# Patient Record
Sex: MALE | Race: WHITE | URBAN - METROPOLITAN AREA
[De-identification: names, ages, dates, MRNs, and addresses within clinical notes are randomized per-mention and may not be internally consistent; named-entity substitution may affect disease eponyms.]

---

## 2017-08-16 ENCOUNTER — INPATIENT (INPATIENT)
Facility: HOSPITAL | Age: 52
LOS: 0 days | Discharge: HOME | End: 2017-08-17
Attending: INTERNAL MEDICINE | Admitting: INTERNAL MEDICINE
Payer: COMMERCIAL

## 2017-08-16 VITALS — WEIGHT: 210.1 LBS | HEIGHT: 74 IN

## 2017-08-16 DIAGNOSIS — I48.91 UNSPECIFIED ATRIAL FIBRILLATION: ICD-10-CM

## 2017-08-16 DIAGNOSIS — Z98.890 OTHER SPECIFIED POSTPROCEDURAL STATES: Chronic | ICD-10-CM

## 2017-08-16 LAB
ALBUMIN SERPL ELPH-MCNC: 4.3 G/DL — SIGNIFICANT CHANGE UP (ref 3.3–5)
ALP SERPL-CCNC: 54 U/L — SIGNIFICANT CHANGE UP (ref 40–120)
ALT FLD-CCNC: 26 U/L — SIGNIFICANT CHANGE UP (ref 12–78)
ANION GAP SERPL CALC-SCNC: 5 MMOL/L — SIGNIFICANT CHANGE UP (ref 5–17)
ANION GAP SERPL CALC-SCNC: 9 MMOL/L — SIGNIFICANT CHANGE UP (ref 5–17)
APTT BLD: 32.9 SEC — SIGNIFICANT CHANGE UP (ref 27.5–37.4)
AST SERPL-CCNC: 15 U/L — SIGNIFICANT CHANGE UP (ref 15–37)
BASOPHILS # BLD AUTO: 0.1 K/UL — SIGNIFICANT CHANGE UP (ref 0–0.2)
BASOPHILS NFR BLD AUTO: 0.9 % — SIGNIFICANT CHANGE UP (ref 0–2)
BILIRUB SERPL-MCNC: 0.8 MG/DL — SIGNIFICANT CHANGE UP (ref 0.2–1.2)
BUN SERPL-MCNC: 13 MG/DL — SIGNIFICANT CHANGE UP (ref 7–23)
BUN SERPL-MCNC: 16 MG/DL — SIGNIFICANT CHANGE UP (ref 7–23)
CALCIUM SERPL-MCNC: 9 MG/DL — SIGNIFICANT CHANGE UP (ref 8.5–10.1)
CALCIUM SERPL-MCNC: 9.5 MG/DL — SIGNIFICANT CHANGE UP (ref 8.5–10.1)
CHLORIDE SERPL-SCNC: 105 MMOL/L — SIGNIFICANT CHANGE UP (ref 96–108)
CHLORIDE SERPL-SCNC: 108 MMOL/L — SIGNIFICANT CHANGE UP (ref 96–108)
CHOLEST SERPL-MCNC: 201 MG/DL — HIGH (ref 10–199)
CK SERPL-CCNC: 37 U/L — SIGNIFICANT CHANGE UP (ref 26–308)
CO2 SERPL-SCNC: 26 MMOL/L — SIGNIFICANT CHANGE UP (ref 22–31)
CO2 SERPL-SCNC: 31 MMOL/L — SIGNIFICANT CHANGE UP (ref 22–31)
CREAT SERPL-MCNC: 1.13 MG/DL — SIGNIFICANT CHANGE UP (ref 0.5–1.3)
CREAT SERPL-MCNC: 1.34 MG/DL — HIGH (ref 0.5–1.3)
EOSINOPHIL # BLD AUTO: 0.2 K/UL — SIGNIFICANT CHANGE UP (ref 0–0.5)
EOSINOPHIL NFR BLD AUTO: 1.9 % — SIGNIFICANT CHANGE UP (ref 0–6)
GLUCOSE SERPL-MCNC: 119 MG/DL — HIGH (ref 70–99)
GLUCOSE SERPL-MCNC: 92 MG/DL — SIGNIFICANT CHANGE UP (ref 70–99)
HCT VFR BLD CALC: 47.3 % — SIGNIFICANT CHANGE UP (ref 39–50)
HDLC SERPL-MCNC: 48 MG/DL — SIGNIFICANT CHANGE UP (ref 40–125)
HGB BLD-MCNC: 16.8 G/DL — SIGNIFICANT CHANGE UP (ref 13–17)
INR BLD: 0.97 RATIO — SIGNIFICANT CHANGE UP (ref 0.88–1.16)
LIPID PNL WITH DIRECT LDL SERPL: 127 MG/DL — SIGNIFICANT CHANGE UP
LYMPHOCYTES # BLD AUTO: 2.5 K/UL — SIGNIFICANT CHANGE UP (ref 1–3.3)
LYMPHOCYTES # BLD AUTO: 30.6 % — SIGNIFICANT CHANGE UP (ref 13–44)
MCHC RBC-ENTMCNC: 34.6 PG — HIGH (ref 27–34)
MCHC RBC-ENTMCNC: 35.6 GM/DL — SIGNIFICANT CHANGE UP (ref 32–36)
MCV RBC AUTO: 97.1 FL — SIGNIFICANT CHANGE UP (ref 80–100)
MONOCYTES # BLD AUTO: 0.6 K/UL — SIGNIFICANT CHANGE UP (ref 0–0.9)
MONOCYTES NFR BLD AUTO: 7.9 % — SIGNIFICANT CHANGE UP (ref 2–14)
NEUTROPHILS # BLD AUTO: 4.8 K/UL — SIGNIFICANT CHANGE UP (ref 1.8–7.4)
NEUTROPHILS NFR BLD AUTO: 58.8 % — SIGNIFICANT CHANGE UP (ref 43–77)
NT-PROBNP SERPL-SCNC: 18 PG/ML — SIGNIFICANT CHANGE UP (ref 0–125)
PLATELET # BLD AUTO: 199 K/UL — SIGNIFICANT CHANGE UP (ref 150–400)
POTASSIUM SERPL-MCNC: 3.6 MMOL/L — SIGNIFICANT CHANGE UP (ref 3.5–5.3)
POTASSIUM SERPL-MCNC: 4.1 MMOL/L — SIGNIFICANT CHANGE UP (ref 3.5–5.3)
POTASSIUM SERPL-SCNC: 3.6 MMOL/L — SIGNIFICANT CHANGE UP (ref 3.5–5.3)
POTASSIUM SERPL-SCNC: 4.1 MMOL/L — SIGNIFICANT CHANGE UP (ref 3.5–5.3)
PROT SERPL-MCNC: 7.5 GM/DL — SIGNIFICANT CHANGE UP (ref 6–8.3)
PROTHROM AB SERPL-ACNC: 10.5 SEC — SIGNIFICANT CHANGE UP (ref 9.8–12.7)
RBC # BLD: 4.88 M/UL — SIGNIFICANT CHANGE UP (ref 4.2–5.8)
RBC # FLD: 12.6 % — SIGNIFICANT CHANGE UP (ref 10.3–14.5)
SODIUM SERPL-SCNC: 140 MMOL/L — SIGNIFICANT CHANGE UP (ref 135–145)
SODIUM SERPL-SCNC: 144 MMOL/L — SIGNIFICANT CHANGE UP (ref 135–145)
T3 SERPL-MCNC: 97 NG/DL — SIGNIFICANT CHANGE UP (ref 80–200)
T4 AB SER-ACNC: 7.8 UG/DL — SIGNIFICANT CHANGE UP (ref 4.6–12)
TOTAL CHOLESTEROL/HDL RATIO MEASUREMENT: 4.2 RATIO — SIGNIFICANT CHANGE UP (ref 3.4–9.6)
TRIGL SERPL-MCNC: 131 MG/DL — SIGNIFICANT CHANGE UP (ref 10–149)
TROPONIN I SERPL-MCNC: <0.015 NG/ML — SIGNIFICANT CHANGE UP (ref 0.01–0.04)
TSH SERPL-MCNC: 1.04 UU/ML — SIGNIFICANT CHANGE UP (ref 0.36–3.74)
WBC # BLD: 8.2 K/UL — SIGNIFICANT CHANGE UP (ref 3.8–10.5)
WBC # FLD AUTO: 8.2 K/UL — SIGNIFICANT CHANGE UP (ref 3.8–10.5)

## 2017-08-16 PROCEDURE — 71010: CPT | Mod: 26

## 2017-08-16 PROCEDURE — 99285 EMERGENCY DEPT VISIT HI MDM: CPT

## 2017-08-16 PROCEDURE — 93010 ELECTROCARDIOGRAM REPORT: CPT

## 2017-08-16 PROCEDURE — 99223 1ST HOSP IP/OBS HIGH 75: CPT

## 2017-08-16 RX ORDER — SODIUM CHLORIDE 9 MG/ML
1000 INJECTION INTRAMUSCULAR; INTRAVENOUS; SUBCUTANEOUS
Qty: 0 | Refills: 0 | Status: DISCONTINUED | OUTPATIENT
Start: 2017-08-16 | End: 2017-08-17

## 2017-08-16 RX ORDER — ASPIRIN/CALCIUM CARB/MAGNESIUM 324 MG
81 TABLET ORAL DAILY
Qty: 0 | Refills: 0 | Status: DISCONTINUED | OUTPATIENT
Start: 2017-08-16 | End: 2017-08-17

## 2017-08-16 RX ORDER — METOPROLOL TARTRATE 50 MG
5 TABLET ORAL ONCE
Qty: 0 | Refills: 0 | Status: DISCONTINUED | OUTPATIENT
Start: 2017-08-16 | End: 2017-08-16

## 2017-08-16 RX ORDER — SODIUM CHLORIDE 9 MG/ML
3 INJECTION INTRAMUSCULAR; INTRAVENOUS; SUBCUTANEOUS ONCE
Qty: 0 | Refills: 0 | Status: COMPLETED | OUTPATIENT
Start: 2017-08-16 | End: 2017-08-16

## 2017-08-16 RX ORDER — SODIUM CHLORIDE 9 MG/ML
1000 INJECTION INTRAMUSCULAR; INTRAVENOUS; SUBCUTANEOUS ONCE
Qty: 0 | Refills: 0 | Status: COMPLETED | OUTPATIENT
Start: 2017-08-16 | End: 2017-08-16

## 2017-08-16 RX ADMIN — SODIUM CHLORIDE 3 MILLILITER(S): 9 INJECTION INTRAMUSCULAR; INTRAVENOUS; SUBCUTANEOUS at 08:18

## 2017-08-16 RX ADMIN — SODIUM CHLORIDE 125 MILLILITER(S): 9 INJECTION INTRAMUSCULAR; INTRAVENOUS; SUBCUTANEOUS at 08:45

## 2017-08-16 RX ADMIN — Medication 81 MILLIGRAM(S): at 15:19

## 2017-08-16 RX ADMIN — SODIUM CHLORIDE 1000 MILLILITER(S): 9 INJECTION INTRAMUSCULAR; INTRAVENOUS; SUBCUTANEOUS at 08:10

## 2017-08-16 NOTE — ED PROVIDER NOTE - OBJECTIVE STATEMENT
52 y/o 51 y/o male in ED c/o worsening palpitations x 2 days.  pt states h/o AF that has been controlled.  pt denies any fever, HA, n/v/d/abd pain.  states mild chest discomfort with intermittent sob.  no sick contacts or recent travel.  tolerating PO.

## 2017-08-16 NOTE — H&P ADULT - HISTORY OF PRESENT ILLNESS
Patient is a 51 y/o/m in ED c/o worsening palpitations x 2 days.  pt states h/o AF that has been controlled.  pt denies any fever, HA, n/v/d/abd pain.  states mild chest discomfort with intermittent sob.  no sick contacts or recent travel.  tolerating PO. Patient is a 51 y/o/m admitted in the ED 8/16 w/ cc of worsening palpitations x 2 days.  Pt states h/o AF that has been controlled - has not been on any medications. Last time with atrial fibrillation - > converted to NSR in 20 hours.  Denies any HA, CP. Intermittent SOB. No jaw pain, shoulder pain or back pain.  pt denies any fever, HA, n/v/d/abd pain.  no sick contacts or recent travel.

## 2017-08-16 NOTE — CONSULT NOTE ADULT - SUBJECTIVE AND OBJECTIVE BOX
PCP:    REQUESTING PHYSICIAN:    REASON FOR CONSULT:    CHIEF COMPLAINT:    HPI:  Patient is a 51 y/o/m admitted in the ED 8/16 w/ cc of worsening palpitations x 2 days.  Pt states h/o AF that has been controlled - has not been on any medications. Last time with atrial fibrillation - > converted to NSR in 20 hours.  Denies any HA, CP. Intermittent SOB. No jaw pain, shoulder pain or back pain.  pt denies any fever, HA, n/v/d/abd pain.  no sick contacts or recent travel. (16 Aug 2017 11:36)  Pt reports a history of negative ETT in the past by his cardiologist in [place]. He has never been anticoagulated for atrial fibrillation. He denies exertional symptoms. His blood pressure is appx 100 systolic. He has had an 80 lb weight loss appx 4 years ago and has been maintained on "diet herbs" since. He is unaware of the contents of the drops.       PAST MEDICAL & SURGICAL HISTORY:  Chiari I malformation  Atrial fibrillation, unspecified type  H/O Spinal surgery      Allergies    cinnamon (Unknown)  Originally Entered as [Unknown] reaction to [safflower oil] (Unknown)  penicillins (Hives)  shellfish (Unknown)    Intolerances        SOCIAL HISTORY:    FAMILY HISTORY:  Family history of diabetes mellitus in father (Father)      MEDICATIONS:  MEDICATIONS  (STANDING):  sodium chloride 0.9%. 1000 milliLiter(s) (125 mL/Hr) IV Continuous <Continuous>  diltiazem    Tablet 30 milliGRAM(s) Oral three times a day  aspirin  chewable 81 milliGRAM(s) Oral daily    MEDICATIONS  (PRN):      REVIEW OF SYSTEMS:    CONSTITUTIONAL: No weakness, fevers or chills  EYES/ENT: No visual changes;  No vertigo or throat pain   NECK: No pain or stiffness  RESPIRATORY: No cough, wheezing, hemoptysis; No shortness of breath  CARDIOVASCULAR: No chest pain or palpitations  GASTROINTESTINAL: No abdominal or epigastric pain. No nausea, vomiting, or hematemesis; No diarrhea or constipation. No melena or hematochezia.  GENITOURINARY: No dysuria, frequency or hematuria  NEUROLOGICAL: No numbness or weakness  SKIN: No itching, burning, rashes, or lesions   All other review of systems is negative unless indicated above    Vital Signs Last 24 Hrs  T(C): 36.8 (16 Aug 2017 17:09), Max: 36.8 (16 Aug 2017 17:09)  T(F): 98.2 (16 Aug 2017 17:09), Max: 98.2 (16 Aug 2017 17:09)  HR: 106 (16 Aug 2017 17:09) (77 - 128)  BP: 101/63 (16 Aug 2017 17:09) (101/63 - 127/89)  BP(mean): --  RR: 13 (16 Aug 2017 12:45) (13 - 19)  SpO2: 98% (16 Aug 2017 17:09) (98% - 100%)    I&O's Summary    16 Aug 2017 07:01  -  16 Aug 2017 17:43  --------------------------------------------------------  IN: 0 mL / OUT: 600 mL / NET: -600 mL        PHYSICAL EXAM:    Constitutional: NAD, awake and alert, well-developed  HEENT: PERR, EOMI,  No oral cyananosis.  Neck:  supple,  No JVD  Respiratory: Breath sounds are clear bilaterally, No wheezing, rales or rhonchi  Cardiovascular: S1 and S2,irregular rate and rhythm, no Murmurs, gallops or rubs  Gastrointestinal: Bowel Sounds present, soft, nontender.   Extremities: No peripheral edema. No clubbing or cyanosis.  Vascular: 2+ peripheral pulses  Neurological: A/O x 3, no focal deficits  Musculoskeletal: no calf tenderness.  Skin: No rashes.      LABS: All Labs Reviewed:                        16.8   8.2   )-----------( 199      ( 16 Aug 2017 07:51 )             47.3     16 Aug 2017 16:47    144    |  108    |  13     ----------------------------<  119    4.1     |  31     |  1.34   16 Aug 2017 07:51    140    |  105    |  16     ----------------------------<  92     3.6     |  26     |  1.13     Ca    9.0        16 Aug 2017 16:47  Ca    9.5        16 Aug 2017 07:51    TPro  7.5    /  Alb  4.3    /  TBili  0.8    /  DBili  x      /  AST  15     /  ALT  26     /  AlkPhos  54     16 Aug 2017 07:51    PT/INR - ( 16 Aug 2017 07:51 )   PT: 10.5 sec;   INR: 0.97 ratio         PTT - ( 16 Aug 2017 07:51 )  PTT:32.9 sec  CARDIAC MARKERS ( 16 Aug 2017 07:51 )  <0.015 ng/mL / x     / 37 U/L / x     / x          Blood Culture:   08-16 @ 07:51  Pro Bnp 18    08-16 @ 16:47  TSH: 1.040      RADIOLOGY/EKG:  < from: 12 Lead ECG (08.16.17 @ 07:48) >  Diagnosis Line Atrial fibrillation with rapid ventricular response  Nonspecific ST abnormality  Abnormal ECG  Confirmed by FELIX CAREY, LAURITA ([id number]) on 8/16/2017 8:29:07 AM    < end of copied text >

## 2017-08-16 NOTE — H&P ADULT - NSHPLABSRESULTS_GEN_ALL_CORE
CBC Full  -  ( 16 Aug 2017 07:51 )  WBC Count : 8.2 K/uL  Hemoglobin : 16.8 g/dL  Hematocrit : 47.3 %  Platelet Count - Automated : 199 K/uL    PT/INR - ( 16 Aug 2017 07:51 )   PT: 10.5 sec;   INR: 0.97 ratio      PTT - ( 16 Aug 2017 07:51 )  PTT:32.9 sec    140  |  105  |  16  ----------------------------<  92  3.6   |  26  |  1.13    Ca    9.5      16 Aug 2017 07:51    TPro  7.5  /  Alb  4.3  /  TBili  0.8  /  DBili  x   /  AST  15  /  ALT  26  /  AlkPhos  54  08-16

## 2017-08-16 NOTE — H&P ADULT - FAMILY HISTORY
No pertinent family history in first degree relatives Father  Still living? Unknown  Family history of diabetes mellitus in father, Age at diagnosis: Age Unknown

## 2017-08-16 NOTE — H&P ADULT - PMH
Atrial fibrillation, unspecified type Atrial fibrillation, unspecified type    Chiari I malformation

## 2017-08-16 NOTE — H&P ADULT - PROBLEM SELECTOR PLAN 1
- ASA  - echo cardiogram  - TSH  - Started on po cardizem  - Cardio eval - CHADS2 score 1 (HTN)  - ASA  - echo cardiogram  - TSH  - Started on po cardizem  - Cardio eval

## 2017-08-16 NOTE — H&P ADULT - NSHPREVIEWOFSYSTEMS_GEN_ALL_CORE
REVIEW OF SYSTEMS:  General: NAD, hemodynamically stable, (-)  fever, (-) chills, (-) weakness  HEENT:  Eyes:  No visual loss, blurred vision, double vision or yellow sclerae. Ears, Nose, Throat:  No hearing loss, sneezing, congestion, runny nose or sore throat.  SKIN:  No rash or itching.  CARDIOVASCULAR:  No chest pain, chest pressure or chest discomfort. No palpitations or edema.  RESPIRATORY:  No shortness of breath, cough or sputum.  GASTROINTESTINAL:  No anorexia, nausea, vomiting or diarrhea. No abdominal pain or blood.  NEUROLOGICAL:  No headache, dizziness, syncope, paralysis, ataxia, numbness or tingling in the extremities. No change in bowel or bladder control.  MUSCULOSKELETAL:  No muscle, back pain, joint pain or stiffness.  HEMATOLOGIC:  No anemia, bleeding or bruising.  LYMPHATICS:  No enlarged nodes. No history of splenectomy.  ENDOCRINOLOGIC:  No reports of sweating, cold or heat intolerance. No polyuria or polydipsia.  ALLERGIES:  No history of asthma, hives, eczema or rhinitis. REVIEW OF SYSTEMS:  General: NAD, hemodynamically stable, (-)  fever, (-) chills, (-) weakness  HEENT:  Eyes:  No visual loss, blurred vision, double vision or yellow sclerae. Ears, Nose, Throat:  No hearing loss, sneezing, congestion, runny nose or sore throat.  SKIN:  No rash or itching.  CARDIOVASCULAR:  No chest pain, chest pressure or chest discomfort. No palpitations or edema.  RESPIRATORY:  intermittent SOB (resolved)  GASTROINTESTINAL:  No anorexia, nausea, vomiting or diarrhea. No abdominal pain or blood.  NEUROLOGICAL:  No headache, dizziness, syncope, paralysis, ataxia, numbness or tingling in the extremities. No change in bowel or bladder control.  MUSCULOSKELETAL:  No muscle, back pain, joint pain or stiffness.  HEMATOLOGIC:  No anemia, bleeding or bruising.  LYMPHATICS:  No enlarged nodes. No history of splenectomy.  ENDOCRINOLOGIC:  No reports of sweating, cold or heat intolerance. No polyuria or polydipsia.  ALLERGIES:  No history of asthma, hives, eczema or rhinitis.

## 2017-08-16 NOTE — ED ADULT NURSE NOTE - OBJECTIVE STATEMENT
Pt complains of "irregular pulse" since 7:10 this morning.  Pt denies chest pain.  EKG done on arrival.  Cardiac and VS monitoring initiated.  20g PIV placed in L AC.

## 2017-08-16 NOTE — H&P ADULT - NSHPPHYSICALEXAM_GEN_ALL_CORE
Physical Exam:   GENERAL APPEARANCE:  NAD, hemodynamically stable  T(C): 36.6 (08-16-17 @ 07:53), Max: 36.6 (08-16-17 @ 07:53)  HR: 77 (08-16-17 @ 08:20) (77 - 128)  BP: 115/77 (08-16-17 @ 08:20) (115/71 - 127/89)  RR: 14 (08-16-17 @ 08:13) (14 - 19)  SpO2: 100% (08-16-17 @ 08:13) (100% - 100%)  HEENT:  Head is normocephalic    Skin:  Warm and dry without any rash   NECK:  Supple without lymphadenopathy.   HEART:  Regular rate and rhythm. normal S1 and S2, No M/R/G  LUNGS:  Good ins/exp effort, no W/R/R/C  ABDOMEN:  Soft, nontender, nondistended with good bowel sounds heard  EXTREMITIES:  Without cyanosis, clubbing or edema.   NEUROLOGICAL:  Gross nonfocal

## 2017-08-16 NOTE — CONSULT NOTE ADULT - PROBLEM SELECTOR RECOMMENDATION 9
Continue to monitor. EP to evaluate. Further chronotropic meds are limited by pt's marginal blood pressure even when is rate is acceptable. Will obtain contents of herbal supplement. Chads 1

## 2017-08-16 NOTE — ED PROVIDER NOTE - PROGRESS NOTE DETAILS
pt with rapid AF controlled currently.   will admit for further treatment and w/u.  case d/w Surguladze and will admit

## 2017-08-17 ENCOUNTER — TRANSCRIPTION ENCOUNTER (OUTPATIENT)
Age: 52
End: 2017-08-17

## 2017-08-17 VITALS
OXYGEN SATURATION: 97 % | SYSTOLIC BLOOD PRESSURE: 115 MMHG | RESPIRATION RATE: 15 BRPM | TEMPERATURE: 98 F | HEART RATE: 83 BPM | DIASTOLIC BLOOD PRESSURE: 76 MMHG

## 2017-08-17 PROCEDURE — 93306 TTE W/DOPPLER COMPLETE: CPT | Mod: 26

## 2017-08-17 PROCEDURE — 99233 SBSQ HOSP IP/OBS HIGH 50: CPT

## 2017-08-17 PROCEDURE — 93010 ELECTROCARDIOGRAM REPORT: CPT

## 2017-08-17 RX ORDER — DILTIAZEM HCL 120 MG
1 CAPSULE, EXT RELEASE 24 HR ORAL
Qty: 0 | Refills: 0 | DISCHARGE
Start: 2017-08-17

## 2017-08-17 RX ORDER — ASPIRIN/CALCIUM CARB/MAGNESIUM 324 MG
1 TABLET ORAL
Qty: 0 | Refills: 0 | DISCHARGE
Start: 2017-08-17

## 2017-08-17 RX ORDER — DILTIAZEM HCL 120 MG
1 CAPSULE, EXT RELEASE 24 HR ORAL
Qty: 42 | Refills: 1 | OUTPATIENT
Start: 2017-08-17 | End: 2017-09-13

## 2017-08-17 RX ADMIN — Medication 81 MILLIGRAM(S): at 13:04

## 2017-08-17 NOTE — DISCHARGE NOTE ADULT - CARE PLAN
Principal Discharge DX:	Paroxysmal a-fib  Goal:	f/u with outpatient cardiologist w/ in one week.  Instructions for follow-up, activity and diet:	c/w cardiazem for rate control; f/u w/ outpatient cardiology

## 2017-08-17 NOTE — DISCHARGE NOTE ADULT - PATIENT PORTAL LINK FT
“You can access the FollowHealth Patient Portal, offered by [facility], by registering with the following website: [email or web address]”

## 2017-08-17 NOTE — DISCHARGE NOTE ADULT - HOSPITAL COURSE
Vital Signs Last 24 Hrs  T(C): 36.9 (17 Aug 2017 10:52), Max: 36.9 (17 Aug 2017 10:52)  T(F): 98.5 (17 Aug 2017 10:52), Max: 98.5 (17 Aug 2017 10:52)  HR: 83 (17 Aug 2017 10:52) (83 - 106)  BP: 115/76 (17 Aug 2017 10:52) (101/63 - 115/76)  BP(mean): --  RR: 15 (17 Aug 2017 10:52) (15 - 15)  SpO2: 97% (17 Aug 2017 10:52) (97% - 98%)    HEENT:   pupils equal and reactive, EOMI, no oropharyngeal lesions, erythema, exudates, oral thrush    NECK:   supple, no carotid bruits, no palpable lymph nodes, no thyromegaly    CV:  +S1, +S2, regular, no murmurs or rubs    RESP:   lungs clear to auscultation bilaterally, no wheezing, rales, rhonchi, good air entry bilaterally    BREAST:  not examined    GI:  abdomen soft, non-tender, non-distended, normal BS, no bruits, no abdominal masses, no palpable masses    RECTAL:  not examined    :  not examined    MSK:   normal muscle tone, no atrophy, no rigidity, no contractions    EXT:   no clubbing, no cyanosis, no edema, no calf pain, swelling or erythema    VASCULAR:  pulses equal and symmetric in the upper and lower extremities    NEURO:  AAOX3, no focal neurological deficits, follows all commands, able to move extremities spontaneously    SKIN:  no ulcers, lesions or rashes        Hospital course:    52 year old man w/ PMH of HTN and paroxysmal afib (on cardiazem @ home) , presents with palpitations. Found in [place] to have  and in afib. He was given several doses of cardiazem to maintain normal HR. He has spontaneously converted now back to sinus rhythum. Evaluated by Dr. Snow for cardiology whom has cleared patient for discharge.   Krystle is asymptomatic and currently has no complaints. he is advised to f/u with his cardiologist Dr. Rios in [place] w/ in one week.

## 2017-08-17 NOTE — PROGRESS NOTE ADULT - SUBJECTIVE AND OBJECTIVE BOX
CHIEF COMPLAINT:    SUBJECTIVE:     REVIEW OF SYSTEMS:    CONSTITUTIONAL: No weakness, fevers or chills  EYES/ENT: No visual changes;  No vertigo or throat pain   NECK: No pain or stiffness  RESPIRATORY: No cough, wheezing, hemoptysis; No shortness of breath  CARDIOVASCULAR: No chest pain or palpitations  GASTROINTESTINAL: No abdominal or epigastric pain. No nausea, vomiting, or hematemesis; No diarrhea or constipation. No melena or hematochezia.  GENITOURINARY: No dysuria, frequency or hematuria  NEUROLOGICAL: No numbness or weakness  SKIN: No itching, burning, rashes, or lesions   All other review of systems is negative unless indicated above    Vital Signs Last 24 Hrs  T(C): 36.3 (17 Aug 2017 04:41), Max: 36.8 (16 Aug 2017 17:09)  T(F): 97.4 (17 Aug 2017 04:41), Max: 98.2 (16 Aug 2017 17:09)  HR: 91 (17 Aug 2017 04:41) (82 - 106)  BP: 108/68 (17 Aug 2017 04:41) (101/63 - 120/84)  BP(mean): --  RR: 13 (16 Aug 2017 12:45) (13 - 13)  SpO2: 98% (17 Aug 2017 04:41) (98% - 100%)    I&O's Summary    16 Aug 2017 07:01  -  17 Aug 2017 07:00  --------------------------------------------------------  IN: 0 mL / OUT: 600 mL / NET: -600 mL        CAPILLARY BLOOD GLUCOSE          PHYSICAL EXAM:    Constitutional: NAD, awake and alert, well-developed  HEENT: PERR, EOMI, Normal Hearing, MMM  Neck: Soft and supple, No LAD, No JVD  Respiratory: Breath sounds are clear bilaterally, No wheezing, rales or rhonchi  Cardiovascular: S1 and S2, regular rate and rhythm, no Murmurs, gallops or rubs  Gastrointestinal: Bowel Sounds present, soft, nontender, nondistended, no guarding, no rebound  Extremities: No peripheral edema  Vascular: 2+ peripheral pulses  Neurological: A/O x 3, no focal deficits  Musculoskeletal: 5/5 strength b/l upper and lower extremities  Skin: No rashes    MEDICATIONS:  MEDICATIONS  (STANDING):  sodium chloride 0.9%. 1000 milliLiter(s) (125 mL/Hr) IV Continuous <Continuous>  diltiazem    Tablet 30 milliGRAM(s) Oral three times a day  aspirin  chewable 81 milliGRAM(s) Oral daily      LABS: All Labs Reviewed:                        16.8   8.2   )-----------( 199      ( 16 Aug 2017 07:51 )             47.3     08-16    144  |  108  |  13  ----------------------------<  119<H>  4.1   |  31  |  1.34<H>    Ca    9.0      16 Aug 2017 16:47    TPro  7.5  /  Alb  4.3  /  TBili  0.8  /  DBili  x   /  AST  15  /  ALT  26  /  AlkPhos  54  08-16    PT/INR - ( 16 Aug 2017 07:51 )   PT: 10.5 sec;   INR: 0.97 ratio         PTT - ( 16 Aug 2017 07:51 )  PTT:32.9 sec  CARDIAC MARKERS ( 16 Aug 2017 07:51 )  <0.015 ng/mL / x     / 37 U/L / x     / x              Patient is a 53 y/o/m admitted in the ED 8/16 w/ cc of worsening palpitations x 2 days.  Pt states h/o AF that has been controlled - has not been on any medications. Last time with atrial fibrillation - > converted to NSR in 20 hours.  Denies any HA, CP. Intermittent SOB. No jaw pain, shoulder pain or back pain.  pt denies any fever, HA, n/v/d/abd pain.  no sick contacts or recent travel.     Assessment and Plan:   Problem/Plan - 1:  ·  Problem: Atrial fibrillation, unspecified type.  Plan: - CHADS2 score 1 (HTN)  - ASA  - echo cardiogram  - TSH  - Started on po cardizem  - Cardio eval
PCP:    REQUESTING PHYSICIAN:    REASON FOR CONSULT:    CHIEF COMPLAINT:    HPI:  Patient is a 53 y/o/m admitted in the ED 8/16 w/ cc of worsening palpitations x 2 days.  Pt states h/o AF that has been controlled - has not been on any medications. Last time with atrial fibrillation - > converted to NSR in 20 hours.  Denies any HA, CP. Intermittent SOB. No jaw pain, shoulder pain or back pain.  pt denies any fever, HA, n/v/d/abd pain.  no sick contacts or recent travel. (16 Aug 2017 11:36)  Pt reports a history of negative ETT in the past by his cardiologist in [place]. He has never been anticoagulated for atrial fibrillation. He denies exertional symptoms. His blood pressure is appx 100 systolic. He has had an 80 lb weight loss appx 4 years ago and has been maintained on "diet herbs" since. He is unaware of the contents of the drops.     8/17/17: Pt feels improved. He has returned to NSR late this am.     PAST MEDICAL & SURGICAL HISTORY:  Chiari I malformation  Atrial fibrillation, unspecified type  H/O Spinal surgery      SOCIAL HISTORY:    FAMILY HISTORY:  Family history of diabetes mellitus in father (Father)      ALLERGIES:  Allergies    cinnamon (Unknown)  Originally Entered as [Unknown] reaction to [safflower oil] (Unknown)  penicillins (Hives)  shellfish (Unknown)    Intolerances        MEDICATIONS:    MEDICATIONS  (STANDING):  sodium chloride 0.9%. 1000 milliLiter(s) (125 mL/Hr) IV Continuous <Continuous>  diltiazem    Tablet 30 milliGRAM(s) Oral three times a day  aspirin  chewable 81 milliGRAM(s) Oral daily    MEDICATIONS  (PRN):        Vital Signs Last 24 Hrs  T(C): 36.9 (17 Aug 2017 10:52), Max: 36.9 (17 Aug 2017 10:52)  T(F): 98.5 (17 Aug 2017 10:52), Max: 98.5 (17 Aug 2017 10:52)  HR: 83 (17 Aug 2017 10:52) (83 - 106)  BP: 115/76 (17 Aug 2017 10:52) (101/63 - 115/76)  BP(mean): --  RR: 15 (17 Aug 2017 10:52) (15 - 15)  SpO2: 97% (17 Aug 2017 10:52) (97% - 98%)Daily     Daily I&O's Summary    16 Aug 2017 07:01  -  17 Aug 2017 07:00  --------------------------------------------------------  IN: 0 mL / OUT: 600 mL / NET: -600 mL        PHYSICAL EXAM:    Constitutional: NAD, awake and alert, well-developed  HEENT: PERR, EOMI,  No oral cyananosis.  Neck:  supple,  No JVD  Respiratory: Breath sounds are clear bilaterally, No wheezing, rales or rhonchi  Cardiovascular: S1 and S2, regular rate and rhythm, no Murmurs, gallops or rubs  Gastrointestinal: Bowel Sounds present, soft, nontender.   Extremities: No peripheral edema. No clubbing or cyanosis.  Vascular: 2+ peripheral pulses  Neurological: A/O x 3, no focal deficits  Musculoskeletal: no calf tenderness.  Skin: No rashes.      LABS: All Labs Reviewed:                        16.8   8.2   )-----------( 199      ( 16 Aug 2017 07:51 )             47.3     16 Aug 2017 16:47    144    |  108    |  13     ----------------------------<  119    4.1     |  31     |  1.34   16 Aug 2017 07:51    140    |  105    |  16     ----------------------------<  92     3.6     |  26     |  1.13     Ca    9.0        16 Aug 2017 16:47  Ca    9.5        16 Aug 2017 07:51    TPro  7.5    /  Alb  4.3    /  TBili  0.8    /  DBili  x      /  AST  15     /  ALT  26     /  AlkPhos  54     16 Aug 2017 07:51    PT/INR - ( 16 Aug 2017 07:51 )   PT: 10.5 sec;   INR: 0.97 ratio         PTT - ( 16 Aug 2017 07:51 )  PTT:32.9 sec  CARDIAC MARKERS ( 16 Aug 2017 07:51 )  <0.015 ng/mL / x     / 37 U/L / x     / x          Blood Culture:   08-16 @ 07:51  Pro Bnp 18    08-16 @ 16:47  TSH: 1.040      RADIOLOGY/EKG:      ECHO/CARDIAC CATHTERIZATION/STRESS TEST:

## 2017-08-17 NOTE — DISCHARGE NOTE ADULT - PLAN OF CARE
f/u with outpatient cardiologist w/ in one week. c/w cardiazem for rate control; f/u w/ outpatient cardiology

## 2017-08-17 NOTE — PROGRESS NOTE ADULT - PROBLEM SELECTOR PLAN 1
Converted to NSR. Will discharge on Cardizem. He will follow up with his cardiologist for follow up.

## 2017-08-17 NOTE — DISCHARGE NOTE ADULT - CARE PROVIDER_API CALL
Yoel Rios), Cardiovascular Disease; Internal Medicine  [address] [place] [address]  Phone: [phone]  Fax: [phone]

## 2017-08-17 NOTE — DISCHARGE NOTE ADULT - MEDICATION SUMMARY - MEDICATIONS TO TAKE
I will START or STAY ON the medications listed below when I get home from the hospital:    aspirin 81 mg oral tablet, chewable  -- 1 tab(s) by mouth once a day  -- Indication: For paroxysmal afib    dilTIAZem 30 mg oral tablet  -- 1 tab(s) by mouth 3 times a day  -- Indication: For paroxysmal afib I will START or STAY ON the medications listed below when I get home from the hospital:    aspirin 81 mg oral tablet, chewable  -- 1 tab(s) by mouth once a day  -- Indication: For paroxysmal afib    dilTIAZem 30 mg oral tablet  -- 1 tab(s) by mouth 3 times a day  -- Indication: For paroxysmal afib    Cardizem 30 mg oral tablet  -- 1 tab(s) by mouth 3 times a day  -- It is very important that you take or use this exactly as directed.  Do not skip doses or discontinue unless directed by your doctor.  Some non-prescription drugs may aggravate your condition.  Read all labels carefully.  If a warning appears, check with your doctor before taking.    -- Indication: For Paroxysmal a-fib

## 2017-08-22 DIAGNOSIS — I10 ESSENTIAL (PRIMARY) HYPERTENSION: ICD-10-CM

## 2017-08-22 DIAGNOSIS — Z88.0 ALLERGY STATUS TO PENICILLIN: ICD-10-CM

## 2017-08-22 DIAGNOSIS — R00.2 PALPITATIONS: ICD-10-CM

## 2017-08-22 DIAGNOSIS — I48.0 PAROXYSMAL ATRIAL FIBRILLATION: ICD-10-CM

## 2017-08-22 DIAGNOSIS — G93.5 COMPRESSION OF BRAIN: ICD-10-CM

## 2017-10-04 ENCOUNTER — TRANSCRIPTION ENCOUNTER (OUTPATIENT)
Age: 52
End: 2017-10-04

## 2018-01-07 ENCOUNTER — TRANSCRIPTION ENCOUNTER (OUTPATIENT)
Age: 53
End: 2018-01-07

## 2018-02-19 ENCOUNTER — TRANSCRIPTION ENCOUNTER (OUTPATIENT)
Age: 53
End: 2018-02-19

## 2018-02-20 ENCOUNTER — APPOINTMENT (OUTPATIENT)
Dept: ORTHOPEDIC SURGERY | Facility: CLINIC | Age: 53
End: 2018-02-20
Payer: COMMERCIAL

## 2018-02-20 VITALS
HEIGHT: 74 IN | BODY MASS INDEX: 32.73 KG/M2 | HEART RATE: 90 BPM | DIASTOLIC BLOOD PRESSURE: 83 MMHG | SYSTOLIC BLOOD PRESSURE: 137 MMHG | WEIGHT: 255 LBS

## 2018-02-20 DIAGNOSIS — M17.12 UNILATERAL PRIMARY OSTEOARTHRITIS, LEFT KNEE: ICD-10-CM

## 2018-02-20 DIAGNOSIS — S89.92XA UNSPECIFIED INJURY OF LEFT LOWER LEG, INITIAL ENCOUNTER: ICD-10-CM

## 2018-02-20 PROBLEM — I48.91 UNSPECIFIED ATRIAL FIBRILLATION: Chronic | Status: ACTIVE | Noted: 2017-08-16

## 2018-02-20 PROCEDURE — 99213 OFFICE O/P EST LOW 20 MIN: CPT

## 2018-02-20 PROCEDURE — 73564 X-RAY EXAM KNEE 4 OR MORE: CPT | Mod: LT

## 2018-02-20 RX ORDER — OSELTAMIVIR PHOSPHATE 75 MG/1
75 CAPSULE ORAL
Qty: 10 | Refills: 0 | Status: ACTIVE | COMMUNITY
Start: 2018-01-07

## 2018-02-20 RX ORDER — DILTIAZEM HYDROCHLORIDE 120 MG/1
120 CAPSULE, EXTENDED RELEASE ORAL
Qty: 90 | Refills: 0 | Status: ACTIVE | COMMUNITY
Start: 2017-09-18

## 2018-02-20 RX ORDER — NAPROXEN 500 MG/1
500 TABLET ORAL
Qty: 20 | Refills: 0 | Status: ACTIVE | COMMUNITY
Start: 2018-02-19

## 2018-02-20 RX ORDER — CLONAZEPAM 0.5 MG/1
0.5 TABLET ORAL
Qty: 20 | Refills: 0 | Status: ACTIVE | COMMUNITY
Start: 2017-08-30

## 2018-02-20 RX ORDER — FLUTICASONE PROPIONATE 50 UG/1
50 SPRAY, METERED NASAL
Qty: 16 | Refills: 0 | Status: ACTIVE | COMMUNITY
Start: 2018-01-07

## 2018-03-26 ENCOUNTER — APPOINTMENT (OUTPATIENT)
Dept: ORTHOPEDIC SURGERY | Facility: CLINIC | Age: 53
End: 2018-03-26
Payer: COMMERCIAL

## 2018-03-26 VITALS
WEIGHT: 255 LBS | HEART RATE: 82 BPM | SYSTOLIC BLOOD PRESSURE: 120 MMHG | BODY MASS INDEX: 32.73 KG/M2 | HEIGHT: 74 IN | DIASTOLIC BLOOD PRESSURE: 82 MMHG

## 2018-03-26 DIAGNOSIS — S89.92XD UNSPECIFIED INJURY OF LEFT LOWER LEG, SUBSEQUENT ENCOUNTER: ICD-10-CM

## 2018-03-26 PROCEDURE — 99213 OFFICE O/P EST LOW 20 MIN: CPT

## 2018-08-30 PROBLEM — G93.5 COMPRESSION OF BRAIN: Chronic | Status: ACTIVE | Noted: 2017-08-16

## 2018-10-01 ENCOUNTER — APPOINTMENT (OUTPATIENT)
Dept: ORTHOPEDIC SURGERY | Facility: CLINIC | Age: 53
End: 2018-10-01
Payer: COMMERCIAL

## 2018-10-01 VITALS
HEART RATE: 77 BPM | SYSTOLIC BLOOD PRESSURE: 128 MMHG | BODY MASS INDEX: 35.29 KG/M2 | HEIGHT: 74 IN | WEIGHT: 275 LBS | DIASTOLIC BLOOD PRESSURE: 77 MMHG

## 2018-10-01 PROCEDURE — 20550 NJX 1 TENDON SHEATH/LIGAMENT: CPT | Mod: RT

## 2018-10-01 PROCEDURE — 99213 OFFICE O/P EST LOW 20 MIN: CPT | Mod: 25

## 2019-02-14 ENCOUNTER — TRANSCRIPTION ENCOUNTER (OUTPATIENT)
Age: 54
End: 2019-02-14

## 2019-04-15 ENCOUNTER — TRANSCRIPTION ENCOUNTER (OUTPATIENT)
Age: 54
End: 2019-04-15

## 2019-04-18 ENCOUNTER — TRANSCRIPTION ENCOUNTER (OUTPATIENT)
Age: 54
End: 2019-04-18

## 2019-04-23 ENCOUNTER — APPOINTMENT (OUTPATIENT)
Dept: ORTHOPEDIC SURGERY | Facility: CLINIC | Age: 54
End: 2019-04-23
Payer: COMMERCIAL

## 2019-04-23 VITALS — HEIGHT: 74 IN

## 2019-04-23 DIAGNOSIS — M25.511 PAIN IN RIGHT SHOULDER: ICD-10-CM

## 2019-04-23 DIAGNOSIS — M75.81 OTHER SHOULDER LESIONS, RIGHT SHOULDER: ICD-10-CM

## 2019-04-23 PROCEDURE — 99214 OFFICE O/P EST MOD 30 MIN: CPT | Mod: 25

## 2019-04-23 PROCEDURE — 20550 NJX 1 TENDON SHEATH/LIGAMENT: CPT | Mod: RT

## 2019-05-03 ENCOUNTER — APPOINTMENT (OUTPATIENT)
Dept: ORTHOPEDIC SURGERY | Facility: CLINIC | Age: 54
End: 2019-05-03
Payer: COMMERCIAL

## 2019-05-03 VITALS
SYSTOLIC BLOOD PRESSURE: 133 MMHG | HEIGHT: 74 IN | DIASTOLIC BLOOD PRESSURE: 81 MMHG | BODY MASS INDEX: 37.86 KG/M2 | WEIGHT: 295 LBS | HEART RATE: 96 BPM

## 2019-05-03 DIAGNOSIS — M19.011 PRIMARY OSTEOARTHRITIS, RIGHT SHOULDER: ICD-10-CM

## 2019-05-03 PROCEDURE — 99213 OFFICE O/P EST LOW 20 MIN: CPT | Mod: 25

## 2019-05-03 PROCEDURE — 20610 DRAIN/INJ JOINT/BURSA W/O US: CPT

## 2019-05-03 PROCEDURE — 73030 X-RAY EXAM OF SHOULDER: CPT

## 2019-06-17 ENCOUNTER — TRANSCRIPTION ENCOUNTER (OUTPATIENT)
Age: 54
End: 2019-06-17

## 2019-07-30 ENCOUNTER — APPOINTMENT (OUTPATIENT)
Dept: ORTHOPEDIC SURGERY | Facility: CLINIC | Age: 54
End: 2019-07-30

## 2019-09-02 ENCOUNTER — INPATIENT (INPATIENT)
Facility: HOSPITAL | Age: 54
LOS: 2 days | Discharge: HOME | DRG: 310 | End: 2019-09-05
Attending: INTERNAL MEDICINE | Admitting: INTERNAL MEDICINE
Payer: COMMERCIAL

## 2019-09-02 VITALS
OXYGEN SATURATION: 99 % | HEIGHT: 74 IN | DIASTOLIC BLOOD PRESSURE: 97 MMHG | SYSTOLIC BLOOD PRESSURE: 151 MMHG | TEMPERATURE: 99 F | WEIGHT: 240.08 LBS | RESPIRATION RATE: 18 BRPM | HEART RATE: 161 BPM

## 2019-09-02 DIAGNOSIS — I48.91 UNSPECIFIED ATRIAL FIBRILLATION: ICD-10-CM

## 2019-09-02 DIAGNOSIS — I10 ESSENTIAL (PRIMARY) HYPERTENSION: ICD-10-CM

## 2019-09-02 DIAGNOSIS — I48.0 PAROXYSMAL ATRIAL FIBRILLATION: ICD-10-CM

## 2019-09-02 DIAGNOSIS — Z98.890 OTHER SPECIFIED POSTPROCEDURAL STATES: Chronic | ICD-10-CM

## 2019-09-02 LAB
ALBUMIN SERPL ELPH-MCNC: 4.3 G/DL — SIGNIFICANT CHANGE UP (ref 3.3–5)
ALP SERPL-CCNC: 50 U/L — SIGNIFICANT CHANGE UP (ref 40–120)
ALT FLD-CCNC: 46 U/L — SIGNIFICANT CHANGE UP (ref 12–78)
ANION GAP SERPL CALC-SCNC: 16 MMOL/L — SIGNIFICANT CHANGE UP (ref 5–17)
AST SERPL-CCNC: 22 U/L — SIGNIFICANT CHANGE UP (ref 15–37)
BASOPHILS # BLD AUTO: 0.06 K/UL — SIGNIFICANT CHANGE UP (ref 0–0.2)
BASOPHILS NFR BLD AUTO: 0.7 % — SIGNIFICANT CHANGE UP (ref 0–2)
BILIRUB SERPL-MCNC: 0.8 MG/DL — SIGNIFICANT CHANGE UP (ref 0.2–1.2)
BUN SERPL-MCNC: 8 MG/DL — SIGNIFICANT CHANGE UP (ref 7–23)
CALCIUM SERPL-MCNC: 9.2 MG/DL — SIGNIFICANT CHANGE UP (ref 8.5–10.1)
CHLORIDE SERPL-SCNC: 106 MMOL/L — SIGNIFICANT CHANGE UP (ref 96–108)
CO2 SERPL-SCNC: 24 MMOL/L — SIGNIFICANT CHANGE UP (ref 22–31)
CREAT SERPL-MCNC: 1.11 MG/DL — SIGNIFICANT CHANGE UP (ref 0.5–1.3)
EOSINOPHIL # BLD AUTO: 0.28 K/UL — SIGNIFICANT CHANGE UP (ref 0–0.5)
EOSINOPHIL NFR BLD AUTO: 3.1 % — SIGNIFICANT CHANGE UP (ref 0–6)
GLUCOSE SERPL-MCNC: 116 MG/DL — HIGH (ref 70–99)
HCT VFR BLD CALC: 48.3 % — SIGNIFICANT CHANGE UP (ref 39–50)
HGB BLD-MCNC: 17.1 G/DL — HIGH (ref 13–17)
IMM GRANULOCYTES NFR BLD AUTO: 0.3 % — SIGNIFICANT CHANGE UP (ref 0–1.5)
LYMPHOCYTES # BLD AUTO: 2.93 K/UL — SIGNIFICANT CHANGE UP (ref 1–3.3)
LYMPHOCYTES # BLD AUTO: 32.8 % — SIGNIFICANT CHANGE UP (ref 13–44)
MCHC RBC-ENTMCNC: 33.5 PG — SIGNIFICANT CHANGE UP (ref 27–34)
MCHC RBC-ENTMCNC: 35.4 GM/DL — SIGNIFICANT CHANGE UP (ref 32–36)
MCV RBC AUTO: 94.7 FL — SIGNIFICANT CHANGE UP (ref 80–100)
MONOCYTES # BLD AUTO: 0.74 K/UL — SIGNIFICANT CHANGE UP (ref 0–0.9)
MONOCYTES NFR BLD AUTO: 8.3 % — SIGNIFICANT CHANGE UP (ref 2–14)
NEUTROPHILS # BLD AUTO: 4.88 K/UL — SIGNIFICANT CHANGE UP (ref 1.8–7.4)
NEUTROPHILS NFR BLD AUTO: 54.8 % — SIGNIFICANT CHANGE UP (ref 43–77)
PLATELET # BLD AUTO: 222 K/UL — SIGNIFICANT CHANGE UP (ref 150–400)
POTASSIUM SERPL-MCNC: 3.4 MMOL/L — LOW (ref 3.5–5.3)
POTASSIUM SERPL-SCNC: 3.4 MMOL/L — LOW (ref 3.5–5.3)
PROT SERPL-MCNC: 7.6 GM/DL — SIGNIFICANT CHANGE UP (ref 6–8.3)
RBC # BLD: 5.1 M/UL — SIGNIFICANT CHANGE UP (ref 4.2–5.8)
RBC # FLD: 14 % — SIGNIFICANT CHANGE UP (ref 10.3–14.5)
SODIUM SERPL-SCNC: 146 MMOL/L — HIGH (ref 135–145)
TROPONIN I SERPL-MCNC: <0.015 NG/ML — SIGNIFICANT CHANGE UP (ref 0.01–0.04)
WBC # BLD: 8.92 K/UL — SIGNIFICANT CHANGE UP (ref 3.8–10.5)
WBC # FLD AUTO: 8.92 K/UL — SIGNIFICANT CHANGE UP (ref 3.8–10.5)

## 2019-09-02 PROCEDURE — 93017 CV STRESS TEST TRACING ONLY: CPT

## 2019-09-02 PROCEDURE — 71046 X-RAY EXAM CHEST 2 VIEWS: CPT | Mod: 26

## 2019-09-02 PROCEDURE — 93018 CV STRESS TEST I&R ONLY: CPT

## 2019-09-02 PROCEDURE — 93306 TTE W/DOPPLER COMPLETE: CPT

## 2019-09-02 PROCEDURE — 83036 HEMOGLOBIN GLYCOSYLATED A1C: CPT

## 2019-09-02 PROCEDURE — 83880 ASSAY OF NATRIURETIC PEPTIDE: CPT

## 2019-09-02 PROCEDURE — 80048 BASIC METABOLIC PNL TOTAL CA: CPT

## 2019-09-02 PROCEDURE — 85027 COMPLETE CBC AUTOMATED: CPT

## 2019-09-02 PROCEDURE — 78452 HT MUSCLE IMAGE SPECT MULT: CPT

## 2019-09-02 PROCEDURE — 83735 ASSAY OF MAGNESIUM: CPT

## 2019-09-02 PROCEDURE — 80061 LIPID PANEL: CPT

## 2019-09-02 PROCEDURE — 36415 COLL VENOUS BLD VENIPUNCTURE: CPT

## 2019-09-02 PROCEDURE — 85025 COMPLETE CBC W/AUTO DIFF WBC: CPT

## 2019-09-02 PROCEDURE — 84443 ASSAY THYROID STIM HORMONE: CPT

## 2019-09-02 PROCEDURE — A9500: CPT

## 2019-09-02 PROCEDURE — 86803 HEPATITIS C AB TEST: CPT

## 2019-09-02 PROCEDURE — 84484 ASSAY OF TROPONIN QUANT: CPT

## 2019-09-02 PROCEDURE — 82550 ASSAY OF CK (CPK): CPT

## 2019-09-02 PROCEDURE — 93005 ELECTROCARDIOGRAM TRACING: CPT

## 2019-09-02 PROCEDURE — 93016 CV STRESS TEST SUPVJ ONLY: CPT

## 2019-09-02 PROCEDURE — 93010 ELECTROCARDIOGRAM REPORT: CPT | Mod: 76

## 2019-09-02 RX ORDER — SODIUM CHLORIDE 9 MG/ML
1000 INJECTION INTRAMUSCULAR; INTRAVENOUS; SUBCUTANEOUS ONCE
Refills: 0 | Status: COMPLETED | OUTPATIENT
Start: 2019-09-02 | End: 2019-09-02

## 2019-09-02 RX ORDER — ACETAMINOPHEN 500 MG
650 TABLET ORAL EVERY 6 HOURS
Refills: 0 | Status: DISCONTINUED | OUTPATIENT
Start: 2019-09-02 | End: 2019-09-05

## 2019-09-02 RX ORDER — ENOXAPARIN SODIUM 100 MG/ML
40 INJECTION SUBCUTANEOUS DAILY
Refills: 0 | Status: DISCONTINUED | OUTPATIENT
Start: 2019-09-02 | End: 2019-09-05

## 2019-09-02 RX ORDER — INFLUENZA VIRUS VACCINE 15; 15; 15; 15 UG/.5ML; UG/.5ML; UG/.5ML; UG/.5ML
0.5 SUSPENSION INTRAMUSCULAR ONCE
Refills: 0 | Status: COMPLETED | OUTPATIENT
Start: 2019-09-02 | End: 2019-09-02

## 2019-09-02 RX ORDER — ASPIRIN/CALCIUM CARB/MAGNESIUM 324 MG
81 TABLET ORAL DAILY
Refills: 0 | Status: DISCONTINUED | OUTPATIENT
Start: 2019-09-02 | End: 2019-09-05

## 2019-09-02 RX ORDER — DILTIAZEM HCL 120 MG
30 CAPSULE, EXT RELEASE 24 HR ORAL ONCE
Refills: 0 | Status: COMPLETED | OUTPATIENT
Start: 2019-09-02 | End: 2019-09-02

## 2019-09-02 RX ORDER — ONDANSETRON 8 MG/1
4 TABLET, FILM COATED ORAL EVERY 6 HOURS
Refills: 0 | Status: DISCONTINUED | OUTPATIENT
Start: 2019-09-02 | End: 2019-09-05

## 2019-09-02 RX ORDER — DILTIAZEM HCL 120 MG
25 CAPSULE, EXT RELEASE 24 HR ORAL ONCE
Refills: 0 | Status: COMPLETED | OUTPATIENT
Start: 2019-09-02 | End: 2019-09-02

## 2019-09-02 RX ORDER — DILTIAZEM HCL 120 MG
27 CAPSULE, EXT RELEASE 24 HR ORAL ONCE
Refills: 0 | Status: DISCONTINUED | OUTPATIENT
Start: 2019-09-02 | End: 2019-09-02

## 2019-09-02 RX ORDER — DILTIAZEM HCL 120 MG
60 CAPSULE, EXT RELEASE 24 HR ORAL EVERY 6 HOURS
Refills: 0 | Status: DISCONTINUED | OUTPATIENT
Start: 2019-09-02 | End: 2019-09-03

## 2019-09-02 RX ORDER — DILTIAZEM HCL 120 MG
30 CAPSULE, EXT RELEASE 24 HR ORAL
Refills: 0 | Status: DISCONTINUED | OUTPATIENT
Start: 2019-09-02 | End: 2019-09-02

## 2019-09-02 RX ORDER — POTASSIUM CHLORIDE 20 MEQ
40 PACKET (EA) ORAL ONCE
Refills: 0 | Status: COMPLETED | OUTPATIENT
Start: 2019-09-02 | End: 2019-09-02

## 2019-09-02 RX ORDER — DILTIAZEM HCL 120 MG
30 CAPSULE, EXT RELEASE 24 HR ORAL EVERY 6 HOURS
Refills: 0 | Status: DISCONTINUED | OUTPATIENT
Start: 2019-09-02 | End: 2019-09-02

## 2019-09-02 RX ADMIN — Medication 60 MILLIGRAM(S): at 23:20

## 2019-09-02 RX ADMIN — Medication 30 MILLIGRAM(S): at 11:47

## 2019-09-02 RX ADMIN — Medication 60 MILLIGRAM(S): at 17:45

## 2019-09-02 RX ADMIN — Medication 40 MILLIEQUIVALENT(S): at 15:59

## 2019-09-02 RX ADMIN — Medication 25 MILLIGRAM(S): at 10:00

## 2019-09-02 RX ADMIN — Medication 81 MILLIGRAM(S): at 17:45

## 2019-09-02 RX ADMIN — SODIUM CHLORIDE 1000 MILLILITER(S): 9 INJECTION INTRAMUSCULAR; INTRAVENOUS; SUBCUTANEOUS at 10:00

## 2019-09-02 RX ADMIN — Medication 38 MILLIGRAM(S): at 11:00

## 2019-09-02 RX ADMIN — ENOXAPARIN SODIUM 40 MILLIGRAM(S): 100 INJECTION SUBCUTANEOUS at 17:46

## 2019-09-02 RX ADMIN — SODIUM CHLORIDE 1000 MILLILITER(S): 9 INJECTION INTRAMUSCULAR; INTRAVENOUS; SUBCUTANEOUS at 11:01

## 2019-09-02 NOTE — ED PROVIDER NOTE - NS HIV RISK FACTOR YES
Principal Discharge DX:	Postpartum state  Goal:	recovery  Instructions for follow-up, activity and diet:	Nothing per vagina for 4-6 weeks or until cleared by MD. Keep surgical site clean, watch for signs of infection which include redness, new onset tenderness at site, discharge, foul smell. Call MD if fever(> 100.3), severe abdominal pain(not alleviated by pain meds), heavy vaginal bleeding or surgical site infection, dizziness. Follow up with Doctor at scheduled appointment. Declined

## 2019-09-02 NOTE — H&P ADULT - NSHPLABSRESULTS_GEN_ALL_CORE
< from: Xray Chest 2 Views PA/Lat (09.02.19 @ 12:24) >      EXAM:  XR CHEST PA LAT 2V                            PROCEDURE DATE:  09/02/2019          INTERPRETATION:  PA and lateral views of the chest. Comparison is made to   08/16/2017     CLINICAL INFORMATION: Chest pain    FINDINGS: The heart size is normal.  The lungs are clear.  The bony   structures are unremarkable.    IMPRESSION: No acute pulmonary disease.    < end of copied text >

## 2019-09-02 NOTE — PROVIDER CONTACT NOTE (OTHER) - DATE AND TIME:
Chief Complaint   Patient presents with     Physical       Kathy Gillis LPN at 11:37 AM on June 16, 2018   02-Sep-2019 17:15

## 2019-09-02 NOTE — H&P ADULT - PROBLEM SELECTOR PLAN 1
admit to telemetry  CHADS -1 (HTN)  Cardiology evaluation  ECHO  check TSH  continue po cardizem   monitor  ASA admit to telemetry  CHADS -1 (HTN)  Cardiology evaluation  Increase Cardizem to 60mg q 6hrs  ECHO  check TSH  monitor  ASA

## 2019-09-02 NOTE — ED PROVIDER NOTE - CLINICAL SUMMARY MEDICAL DECISION MAKING FREE TEXT BOX
Pt with h/o PAF with sudden Afib with RR since this AM. No clear cause. On Diltiazem, no AC. Will touch base with pt's cardiologist after rate control. Chadsfast score: 1.

## 2019-09-02 NOTE — H&P ADULT - HISTORY OF PRESENT ILLNESS
54 year old man with history fo chiari malformation, paf not on AC, HTN presented to the ED with complaints of palpitations which started this AM. He had a heavy dinner last night and thought it was just indigestion but he was able to feel himself go into AFIB. He has history of Afib in the past but was not started on VKA. Patient is on Cardizem at home for HTN. He took 2/81mg aspirin prior to coming to the ED. Denies chest pain, nausea/vomiting, felt a little short of breath when he was having the palpitations.   ED course- Afib with RVR 's given Cardizem IV x2- now rate controlled in the 80's.     PMH- as above  PSH- spinal surgery for Chiari malformation   FMH- diabetes- Father  Social- denies smoking or drinking 54 year old man with history fo chiari malformation, paf not on AC, HTN presented to the ED with complaints of palpitations which started this AM. He had a heavy dinner last night and thought it was just indigestion but he was able to feel himself go into AFIB. He has history of Afib in the past but was not started on VKA. Patient is on Cardizem at home for HTN. He took 2 x 81mg aspirin prior to coming to the ED. Denies chest pain, nausea/vomiting, felt a little short of breath when he was having the palpitations.   ED course- Afib with RVR 's given Cardizem IV x2- now rate controlled in the 80's.     PMH- as above  PSH- spinal surgery for Chiari malformation   FMH- father has DM, also MI in his 60s  Social- denies smoking or drinking

## 2019-09-02 NOTE — ED ADULT NURSE REASSESSMENT NOTE - NS ED NURSE REASSESS COMMENT FT1
Pt received alert and oriented x3 VSS afebrile. Pt continues to be AFIB on monitor 80-90s at rest up to 115 with movement. Pt c/o slight indigestion, offered medication-refused at this time. Pt given menu to order food. Pt voiding CYU in urinal-pt told NOT to get up alone due to elevated HR on ambulation. Plan of care reviewed, all needs addressed and safety maintained. Call bell in reach.

## 2019-09-02 NOTE — H&P ADULT - NSHPPHYSICALEXAM_GEN_ALL_CORE
Vital Signs Last 24 Hrs  T(C): 37.3 (02 Sep 2019 14:38), Max: 37.3 (02 Sep 2019 14:38)  T(F): 99.1 (02 Sep 2019 14:38), Max: 99.1 (02 Sep 2019 14:38)  HR: 98 (02 Sep 2019 14:38) (98 - 161)  BP: 129/80 (02 Sep 2019 14:38) (124/93 - 151/97)  BP(mean): --  RR: 16 (02 Sep 2019 14:38) (15 - 18)  SpO2: 99% (02 Sep 2019 14:38) (99% - 100%)    PE:  Constitutional: NAD, laying in bed  HEENT: NC/AT  Back: no tenderness  Respiratory: respirations even and non labored, LCTA  Cardiovascular: S1S2 irregular, no murmurs  Abdomen: soft, not tender, not distended, positive BS  Genitourinary: voiding  Rectal: deferred  Musculoskeletal: no muscle tenderness, no joint swelling or tenderness  Extremities: no pedal edema   Neurological: no focal deficits

## 2019-09-02 NOTE — H&P ADULT - NSHPREVIEWOFSYSTEMS_GEN_ALL_CORE
REVIEW OF SYSTEMS:    CONSTITUTIONAL: No weakness, fevers or chills  EYES/ENT: No visual changes;  No vertigo or throat pain   NECK: No pain or stiffness  RESPIRATORY: No cough, wheezing, hemoptysis; No shortness of breath  CARDIOVASCULAR: No chest pain, +palpitations  GASTROINTESTINAL: No abdominal or epigastric pain. No nausea, vomiting, or hematemesis; No diarrhea or constipation. No melena or hematochezia.  GENITOURINARY: No dysuria, frequency or hematuria  NEUROLOGICAL: No numbness or weakness  SKIN: No itching, burning, rashes, or lesions   All other review of systems is negative unless indicated above.

## 2019-09-02 NOTE — ED ADULT NURSE REASSESSMENT NOTE - NSIMPLEMENTINTERV_GEN_ALL_ED
Implemented All Fall with Harm Risk Interventions:  [place] to call system. Call bell, personal items and telephone within reach. Instruct patient to call for assistance. Room bathroom lighting operational. Non-slip footwear when patient is off stretcher. Physically safe environment: no spills, clutter or unnecessary equipment. Stretcher in lowest position, wheels locked, appropriate side rails in place. Provide visual cue, wrist band, yellow gown, etc. Monitor gait and stability. Monitor for mental status changes and reorient to person, place, and time. Review medications for side effects contributing to fall risk. Reinforce activity limits and safety measures with patient and family. Provide visual clues: red socks.

## 2019-09-02 NOTE — H&P ADULT - ATTENDING COMMENTS
I had a face to face encounter with patient and examined him with NP Patty Alcazar. I agree with the H&P and formulated plan.    1. Uncontrolled Afib:  increase Cardizem to 60mg q6hrs  ASA  Chads: 1    2. r/o ACS:   Trops x 3   ASA 81mg/day

## 2019-09-02 NOTE — PATIENT PROFILE ADULT - FALL HARM RISK
1. Have you been to the ER, urgent care clinic since your last visit? Hospitalized since your last visit? No    2. Have you seen or consulted any other health care providers outside of the [address] [place] [address] since your last visit? Include any pap smears or colon screening.  No
Subjective:      Duncan Gaxiola is a 79 y.o. female presents for postop care 19 days following laparoscopic cholecystectomy by Dr. Bridgette Abarca. Appetite is good. Eating a regular diet. without difficulty. Bowel movements are constipated. The patient is not having any pain. Would like to resume PT for her back. Denies fever, nausea, shortness of breath, chest pain, redness at incision site, vomiting and diarrhea    Pathology:  Chronic cholecystitis with cholelithiasis    Advance directive not on file      Objective:     Visit Vitals    /88 (BP 1 Location: Left arm, BP Patient Position: Sitting)    Pulse 82    Temp 98.4 °F (36.9 °C) (Oral)    Resp 18    Ht 5' 5\" (1.651 m)    Wt 136 lb (61.7 kg)    SpO2 95%    BMI 22.63 kg/m2       General:  alert, no distress   Abdomen: soft, bowel sounds active, non-tender   Incision:   healing well, no drainage, no erythema, no seroma, no swelling, no dehiscence, incisions well approximated   Heart: regular rate and rhythm, S1, S2 normal, no murmur, click, rub or gallop   Lungs: clear to auscultation bilaterally     Assessment:     1. Chronic cholecystitis with cholelithiasis, status post lap thierno. Doing well postoperatively. Plan:     1. Pt is to increase activities as tolerated. .  2. Follow-up: prn. Ms. Desiree Nieves has a reminder for a \"due or due soon\" health maintenance. I have asked that she contact her primary care provider for follow-up on this health maintenance. Patient verbalized understanding and agreement.
other

## 2019-09-02 NOTE — ED PROVIDER NOTE - CARDIAC, MLM
+tachycardic rate, irregularly irregular rhythm.  Heart sounds S1, S2.  No murmurs, rubs or gallops.

## 2019-09-02 NOTE — ED ADULT TRIAGE NOTE - CHIEF COMPLAINT QUOTE
Patient comes to ED for palpitations and SOB since 9 am this morning. Pt reports hx of Afib. Pt denies any chest pain. pt reports taking 1-81mg asa. Pt takes cardizem 180mg daily was taken last night.

## 2019-09-02 NOTE — ED PROVIDER NOTE - OBJECTIVE STATEMENT
53 y/o M with a PMHx of Chiari malformation, Afib, HTN, presenting to the ED c/o palpitations and mild SOB beginning around 9AM this morning. States that he is able to feel himself go into Afib. This morning, pt felt indigestion and SOB, and states that he felt as though his "heart was not beating in a normal rhythm" prompting ED visit. Pt is on Cardizem at home. Pt took 2 81mg ASA PTA. Denies N/V, fever, CP, h/o cardiac surgeries, or cough. Cardiologist: Dr. Yoel Rios in [place].

## 2019-09-03 DIAGNOSIS — E66.9 OBESITY, UNSPECIFIED: ICD-10-CM

## 2019-09-03 LAB
ANION GAP SERPL CALC-SCNC: 10 MMOL/L — SIGNIFICANT CHANGE UP (ref 5–17)
BASOPHILS # BLD AUTO: 0.06 K/UL — SIGNIFICANT CHANGE UP (ref 0–0.2)
BASOPHILS NFR BLD AUTO: 0.5 % — SIGNIFICANT CHANGE UP (ref 0–2)
BUN SERPL-MCNC: 11 MG/DL — SIGNIFICANT CHANGE UP (ref 7–23)
CALCIUM SERPL-MCNC: 8.6 MG/DL — SIGNIFICANT CHANGE UP (ref 8.5–10.1)
CHLORIDE SERPL-SCNC: 107 MMOL/L — SIGNIFICANT CHANGE UP (ref 96–108)
CHOLEST SERPL-MCNC: 210 MG/DL — HIGH (ref 10–199)
CO2 SERPL-SCNC: 26 MMOL/L — SIGNIFICANT CHANGE UP (ref 22–31)
CREAT SERPL-MCNC: 0.92 MG/DL — SIGNIFICANT CHANGE UP (ref 0.5–1.3)
EOSINOPHIL # BLD AUTO: 0.24 K/UL — SIGNIFICANT CHANGE UP (ref 0–0.5)
EOSINOPHIL NFR BLD AUTO: 2.1 % — SIGNIFICANT CHANGE UP (ref 0–6)
GLUCOSE SERPL-MCNC: 104 MG/DL — HIGH (ref 70–99)
HBA1C BLD-MCNC: 5.4 % — SIGNIFICANT CHANGE UP (ref 4–5.6)
HCT VFR BLD CALC: 48.5 % — SIGNIFICANT CHANGE UP (ref 39–50)
HCV AB S/CO SERPL IA: 0.21 S/CO — SIGNIFICANT CHANGE UP (ref 0–0.99)
HCV AB SERPL-IMP: SIGNIFICANT CHANGE UP
HDLC SERPL-MCNC: 42 MG/DL — SIGNIFICANT CHANGE UP
HGB BLD-MCNC: 16.8 G/DL — SIGNIFICANT CHANGE UP (ref 13–17)
IMM GRANULOCYTES NFR BLD AUTO: 0.4 % — SIGNIFICANT CHANGE UP (ref 0–1.5)
LIPID PNL WITH DIRECT LDL SERPL: 138 MG/DL — HIGH
LYMPHOCYTES # BLD AUTO: 2.27 K/UL — SIGNIFICANT CHANGE UP (ref 1–3.3)
LYMPHOCYTES # BLD AUTO: 20 % — SIGNIFICANT CHANGE UP (ref 13–44)
MCHC RBC-ENTMCNC: 33.2 PG — SIGNIFICANT CHANGE UP (ref 27–34)
MCHC RBC-ENTMCNC: 34.6 GM/DL — SIGNIFICANT CHANGE UP (ref 32–36)
MCV RBC AUTO: 95.8 FL — SIGNIFICANT CHANGE UP (ref 80–100)
MONOCYTES # BLD AUTO: 0.88 K/UL — SIGNIFICANT CHANGE UP (ref 0–0.9)
MONOCYTES NFR BLD AUTO: 7.8 % — SIGNIFICANT CHANGE UP (ref 2–14)
NEUTROPHILS # BLD AUTO: 7.86 K/UL — HIGH (ref 1.8–7.4)
NEUTROPHILS NFR BLD AUTO: 69.2 % — SIGNIFICANT CHANGE UP (ref 43–77)
PLATELET # BLD AUTO: 207 K/UL — SIGNIFICANT CHANGE UP (ref 150–400)
POTASSIUM SERPL-MCNC: 3.7 MMOL/L — SIGNIFICANT CHANGE UP (ref 3.5–5.3)
POTASSIUM SERPL-SCNC: 3.7 MMOL/L — SIGNIFICANT CHANGE UP (ref 3.5–5.3)
RBC # BLD: 5.06 M/UL — SIGNIFICANT CHANGE UP (ref 4.2–5.8)
RBC # FLD: 14.6 % — HIGH (ref 10.3–14.5)
SODIUM SERPL-SCNC: 143 MMOL/L — SIGNIFICANT CHANGE UP (ref 135–145)
TOTAL CHOLESTEROL/HDL RATIO MEASUREMENT: 5 RATIO — SIGNIFICANT CHANGE UP (ref 3.4–9.6)
TRIGL SERPL-MCNC: 150 MG/DL — HIGH (ref 10–149)
TROPONIN I SERPL-MCNC: <0.015 NG/ML — SIGNIFICANT CHANGE UP (ref 0.01–0.04)
TSH SERPL-MCNC: 1.97 UU/ML — SIGNIFICANT CHANGE UP (ref 0.34–4.82)
WBC # BLD: 11.35 K/UL — HIGH (ref 3.8–10.5)
WBC # FLD AUTO: 11.35 K/UL — HIGH (ref 3.8–10.5)

## 2019-09-03 PROCEDURE — 93306 TTE W/DOPPLER COMPLETE: CPT | Mod: 26

## 2019-09-03 PROCEDURE — 93010 ELECTROCARDIOGRAM REPORT: CPT

## 2019-09-03 PROCEDURE — 99223 1ST HOSP IP/OBS HIGH 75: CPT

## 2019-09-03 RX ORDER — ATORVASTATIN CALCIUM 80 MG/1
10 TABLET, FILM COATED ORAL AT BEDTIME
Refills: 0 | Status: DISCONTINUED | OUTPATIENT
Start: 2019-09-03 | End: 2019-09-05

## 2019-09-03 RX ORDER — POTASSIUM CHLORIDE 20 MEQ
40 PACKET (EA) ORAL ONCE
Refills: 0 | Status: COMPLETED | OUTPATIENT
Start: 2019-09-03 | End: 2019-09-03

## 2019-09-03 RX ORDER — DILTIAZEM HCL 120 MG
180 CAPSULE, EXT RELEASE 24 HR ORAL DAILY
Refills: 0 | Status: DISCONTINUED | OUTPATIENT
Start: 2019-09-04 | End: 2019-09-05

## 2019-09-03 RX ORDER — DILTIAZEM HCL 120 MG
60 CAPSULE, EXT RELEASE 24 HR ORAL EVERY 6 HOURS
Refills: 0 | Status: COMPLETED | OUTPATIENT
Start: 2019-09-03 | End: 2019-09-03

## 2019-09-03 RX ADMIN — Medication 650 MILLIGRAM(S): at 11:18

## 2019-09-03 RX ADMIN — Medication 60 MILLIGRAM(S): at 22:38

## 2019-09-03 RX ADMIN — Medication 60 MILLIGRAM(S): at 11:23

## 2019-09-03 RX ADMIN — Medication 60 MILLIGRAM(S): at 17:28

## 2019-09-03 RX ADMIN — ENOXAPARIN SODIUM 40 MILLIGRAM(S): 100 INJECTION SUBCUTANEOUS at 17:28

## 2019-09-03 RX ADMIN — Medication 81 MILLIGRAM(S): at 11:23

## 2019-09-03 RX ADMIN — Medication 60 MILLIGRAM(S): at 06:24

## 2019-09-03 RX ADMIN — Medication 40 MILLIEQUIVALENT(S): at 13:39

## 2019-09-03 NOTE — CONSULT NOTE ADULT - SUBJECTIVE AND OBJECTIVE BOX
CHIEF COMPLAINT:    HPI:  54 year old man with history fo chiari malformation, paf not on AC, HTN presented to the ED with complaints of palpitations which started yesterday  AM. He had a heavy dinner last before night and thought it was just indigestion but he was able to feel himself go into AFIB. He has history of Afib in the past but was not started on VKA. Patient is on Cardizem 120 mg at home for HTN. He took 2 x 81mg aspirin prior to coming to the ED. Denies chest pain, nausea/vomiting, felt a little short of breath when he was having the palpitations.   ED course- Afib with RVR 's given Cardizem IV x2- was given cardizem  with improvement in rate  , patient was in atrial fibrillation until this am , when he converted to sinus 9 am around ,     Patient did eat chocolate day before ,      patient denies any chest pain or shortness of breath or dizziness ,           PAST MEDICAL & SURGICAL HISTORY:  HTN (hypertension)  Chiari I malformation  Atrial fibrillation, unspecified type  H/O Spinal surgery  gout       Allergies    cinnamon (Unknown)  penicillins (Hives)  Safflower Oil (Unknown)  shellfish (Unknown)    Intolerances        SOCIAL HISTORY: ,  denies smoking or drinking (02 Sep 2019 15:30)    FAMILY HISTORY:  Family history of diabetes mellitus in father  father has DM, also MI in his 60s    MEDICATIONS:  MEDICATIONS  (STANDING):  aspirin  chewable 81 milliGRAM(s) Oral daily  diltiazem    Tablet 60 milliGRAM(s) Oral every 6 hours  enoxaparin Injectable 40 milliGRAM(s) SubCutaneous daily    MEDICATIONS  (PRN):  acetaminophen   Tablet .. 650 milliGRAM(s) Oral every 6 hours PRN Temp greater or equal to 38C (100.4F), Mild Pain (1 - 3)  ondansetron Injectable 4 milliGRAM(s) IV Push every 6 hours PRN Nausea      REVIEW OF SYSTEMS:    CONSTITUTIONAL: No weakness, fevers or chills  EYES/ENT: No visual changes;  No vertigo or throat pain   NECK: No pain or stiffness  RESPIRATORY: No cough, wheezing, hemoptysis; No shortness of breath  CARDIOVASCULAR: No chest pain or palpitations  GASTROINTESTINAL: No abdominal or epigastric pain. No nausea, vomiting, or hematemesis; No diarrhea or constipation. No melena or hematochezia.  GENITOURINARY: No dysuria, frequency or hematuria  NEUROLOGICAL: No numbness or weakness  SKIN: No itching, burning, rashes, or lesions   All other review of systems is negative unless indicated above    Vital Signs Last 24 Hrs  T(C): 36.7 (03 Sep 2019 10:37), Max: 37.3 (02 Sep 2019 14:38)  T(F): 98.1 (03 Sep 2019 10:37), Max: 99.1 (02 Sep 2019 14:38)  HR: 97 (03 Sep 2019 10:37) (77 - 98)  BP: 127/85 (03 Sep 2019 10:37) (112/73 - 129/80)  BP(mean): --  RR: 18 (03 Sep 2019 10:37) (16 - 18)  SpO2: 100% (03 Sep 2019 10:37) (99% - 100%)    I&O's Summary    02 Sep 2019 07:01  -  03 Sep 2019 07:00  --------------------------------------------------------  IN: 1000 mL / OUT: 1590 mL / NET: -590 mL        PHYSICAL EXAM:    Constitutional: NAD, awake and alert, well-developed  HEENT: PERR, EOMI,  No oral cyananosis.  Neck:  supple,  No JVD  Respiratory: Breath sounds are clear bilaterally, No wheezing, rales or rhonchi  Cardiovascular: S1 and S2, regular rate and rhythm, no Murmurs, gallops or rubs  Gastrointestinal: Bowel Sounds present, soft, nontender.   Extremities: No peripheral edema. No clubbing or cyanosis.  Vascular: 2+ peripheral pulses  Neurological: A/O x 3, no focal deficits  Musculoskeletal: no calf tenderness.  Skin: No rashes.      LABS: All Labs Reviewed:                        16.8   11.35 )-----------( 207      ( 03 Sep 2019 07:06 )             48.5                         17.1   8.92  )-----------( 222      ( 02 Sep 2019 09:53 )             48.3     03 Sep 2019 07:06    143    |  107    |  11     ----------------------------<  104    3.7     |  26     |  0.92   02 Sep 2019 09:53    146    |  106    |  8      ----------------------------<  116    3.4     |  24     |  1.11     Ca    8.6        03 Sep 2019 07:06  Ca    9.2        02 Sep 2019 09:53    TPro  7.6    /  Alb  4.3    /  TBili  0.8    /  DBili  x      /  AST  22     /  ALT  46     /  AlkPhos  50     02 Sep 2019 09:53      CARDIAC MARKERS ( 03 Sep 2019 07:06 )  <0.015 ng/mL / x     / x     / x     / x      CARDIAC MARKERS ( 02 Sep 2019 09:53 )  <0.015 ng/mL / x     / x     / x     / x          Blood Culture:   09-02 @ 09:53  Pro Bnp 7    09-03 @ 07:06  TSH: 1.97      RADIOLOGY/EKG:< from: 12 Lead ECG (09.02.19 @ 09:47) >  Atrial fibrillation with rapid ventricular response  ST depression, consider subendocardial injury  Abnormal QRS-T angle, consider primary T wave abnormality  Abnormal ECG  When compared with ECG of 17-AUG-2017 13:27,  Atrial fibrillation has replaced Sinus rhythm  Vent. rate has increased BY  61 BPM  Questionable change in QRS axis  ST now depressed in Inferior leads  ST now depressed in Anterolateral leads  Confirmed by TERE CAREY, [place] ([id number]) on 9/3/2019 7:25:16 AM    < end of copied text >      < from: 12 Lead ECG (09.02.19 @ 11:53) >  Atrial fibrillation  Abnormal ECG  When compared with ECG of 02-SEP-2019 09:47,  Vent. rate has decreased BY  61 BPM  ST no longer depressed in Anterolateral leads    < end of copied text >  < from: 12 Lead ECG (09.03.19 @ 07:47) >  Atrial fibrillation  Abnormal ECG  When compared with ECG of 02-SEP-2019 11:53,  No significant change was found    < end of copied text >    Echo  Normal LVEF RVEF

## 2019-09-03 NOTE — PROGRESS NOTE ADULT - ASSESSMENT
· Assessment      54 year old man with history of PAF admitted with      * Paroxysmal atrial fibrillation.   ruled out ACS    Plan: admit to telemetry  CHADS -1 (HTN)  Cardiology evaluation  Increase Cardizem to 60mg q 6hrs-->    ECHO EF wnl  check TSH 1.6   ASA.    *  Hypertension, unspecified type.    Plan: monitor.     Dispo - d/c planning in am if HR well controlled · Assessment		  54 year old man with history of PAF admitted with      * Paroxysmal atrial fibrillation.   ruled out ACS    Plan: admit to telemetry  CHADS -1 (HTN)  Cardiology evaluation  Increase Cardizem to 60mg q 6hrs-->    ECHO EF wnl  check TSH 1.6   ASA.    *  Hypertension, unspecified type.    Plan: monitor.     * HLD  - statins    Dispo - d/c planning in am if HR well controlled

## 2019-09-03 NOTE — PROGRESS NOTE ADULT - SUBJECTIVE AND OBJECTIVE BOX
Subjective:  Patient is a 54y old  Male who presents with a chief complaint of palpitations     HPI:      54 year old man with history fo chiari malformation, paf not on AC, HTN presented to the ED  on 9/2/19 with complaints of palpitations which started this AM. He had a heavy dinner last night and thought it was just indigestion but he was able to feel himself go into AFIB. He has history of Afib in the past but was not started on VKA. Patient is on Cardizem at home for HTN. He took 2 x 81mg aspirin prior to coming to the ED. Denies chest pain, nausea/vomiting, felt a little short of breath when he was having the palpitations.   ED course- Afib with RVR 's given Cardizem IV x2- now rate controlled in the 80's.     9/3 - Patient seen and examined at bedside earlier today, palpitations resolved,  + some epigastric discomfort, denies dyspnea, HA, dizziness, abdominal pain    Review of system- Rest of the review of system are negative except mentioned in HPI    OBJECTIVE:   T(C): 37 (09-03-19 @ 17:30), Max: 37 (09-03-19 @ 17:30)  HR: 85 (09-03-19 @ 17:30) (77 - 97)  BP: 126/83 (09-03-19 @ 17:30) (112/73 - 127/85)  RR: 18 (09-03-19 @ 17:30) (16 - 18)  SpO2: 100% (09-03-19 @ 17:30) (100% - 100%)  Wt(kg): --  Daily     Daily     PHYSICAL EXAM:  GENERAL: NAD  NERVOUS SYSTEM:  Alert & Oriented X3, non- focal exam, Motor Strength 5/5 B/L upper and lower extremities; DTRs 2+ intact and symmetric  HEAD:  Atraumatic, Normocephalic  EYES: EOMI, PERRLA, conjunctiva and sclera clear  HEENT: Moist mucous membranes  NECK: Supple, No JVD  CHEST/LUNG: Clear to auscultation bilaterally; No rales, no rhonchi, no wheezing, or rubs  HEART: Regular rate and rhythm; No murmurs, rubs, or gallops  ABDOMEN: Soft, Nontender, Nondistended; Bowel sounds present  GENITOURINARY- Voiding, no suprapubic tenderness  EXTREMITIES:  2+ Peripheral Pulses, No clubbing, cyanosis, or edema  MUSCULOSKELETAL:- No muscle tenderness, Muscle tone normal, No joint tenderness, no Joint swelling, Joint range of motion-normal  SKIN-no rash, no lesion    LABS:                        16.8   11.35 )-----------( 207      ( 03 Sep 2019 07:06 )             48.5     09-03    143  |  107  |  11  ----------------------------<  104<H>  3.7   |  26  |  0.92    Ca    8.6      03 Sep 2019 07:06  Mg     2.1     09-03    TPro  7.6  /  Alb  4.3  /  TBili  0.8  /  DBili  x   /  AST  22  /  ALT  46  /  AlkPhos  50  09-02      CARDIAC MARKERS ( 03 Sep 2019 07:06 )  <0.015 ng/mL / x     / x     / x     / x      CARDIAC MARKERS ( 02 Sep 2019 09:53 )  <0.015 ng/mL / x     / x     / x     / x          < from: 12 Lead ECG (09.03.19 @ 07:47) >    Ventricular Rate 79 BPM    Atrial Rate 250 BPM    QRS Duration 96 ms    Q-T Interval 374 ms    QTC Calculation(Bezet) 428 ms    R Axis 47 degrees    T Axis 29 degrees    Diagnosis Line Atrial fibrillation  Abnormal ECG  When compared with ECG of 02-SEP-2019 11:53,  No significant change was found    < end of copied text >      CAPILLARY BLOOD GLUCOSE            RECENT CULTURES:    RADIOLOGY & ADDITIONAL TESTS:  < from: Xray Chest 2 Views PA/Lat (09.02.19 @ 12:24) >    INTERPRETATION:  PA and lateral views of the chest. Comparison is made to   08/16/2017     CLINICAL INFORMATION: Chest pain    FINDINGS: The heart size is normal.  The lungs are clear.  The bony   structures are unremarkable.    IMPRESSION: No acute pulmonary disease.      < end of copied text >    Current medications:  acetaminophen   Tablet .. 650 milliGRAM(s) Oral every 6 hours PRN  aspirin  chewable 81 milliGRAM(s) Oral daily  atorvastatin 10 milliGRAM(s) Oral at bedtime  diltiazem    Tablet 60 milliGRAM(s) Oral every 6 hours  enoxaparin Injectable 40 milliGRAM(s) SubCutaneous daily  ondansetron Injectable 4 milliGRAM(s) IV Push every 6 hours PRN

## 2019-09-03 NOTE — CONSULT NOTE ADULT - PROBLEM SELECTOR RECOMMENDATION 9
with rapid rate with symptoms , negative troponin  normal LVEF , now converted to sinus rhythm ,increase cardizem 120  to cardizem 180 mg , ecotrin   CHADVASC score 0-1 ,    OP stress test.   patient has his regular cardiologist ,   he will make appointment

## 2019-09-04 DIAGNOSIS — R07.9 CHEST PAIN, UNSPECIFIED: ICD-10-CM

## 2019-09-04 LAB
ANION GAP SERPL CALC-SCNC: 6 MMOL/L — SIGNIFICANT CHANGE UP (ref 5–17)
BUN SERPL-MCNC: 13 MG/DL — SIGNIFICANT CHANGE UP (ref 7–23)
CALCIUM SERPL-MCNC: 9.1 MG/DL — SIGNIFICANT CHANGE UP (ref 8.5–10.1)
CHLORIDE SERPL-SCNC: 106 MMOL/L — SIGNIFICANT CHANGE UP (ref 96–108)
CO2 SERPL-SCNC: 27 MMOL/L — SIGNIFICANT CHANGE UP (ref 22–31)
CREAT SERPL-MCNC: 1.1 MG/DL — SIGNIFICANT CHANGE UP (ref 0.5–1.3)
GLUCOSE SERPL-MCNC: 105 MG/DL — HIGH (ref 70–99)
HCT VFR BLD CALC: 50 % — SIGNIFICANT CHANGE UP (ref 39–50)
HGB BLD-MCNC: 17.2 G/DL — HIGH (ref 13–17)
MCHC RBC-ENTMCNC: 33.1 PG — SIGNIFICANT CHANGE UP (ref 27–34)
MCHC RBC-ENTMCNC: 34.4 GM/DL — SIGNIFICANT CHANGE UP (ref 32–36)
MCV RBC AUTO: 96.3 FL — SIGNIFICANT CHANGE UP (ref 80–100)
PLATELET # BLD AUTO: 219 K/UL — SIGNIFICANT CHANGE UP (ref 150–400)
POTASSIUM SERPL-MCNC: 3.9 MMOL/L — SIGNIFICANT CHANGE UP (ref 3.5–5.3)
POTASSIUM SERPL-SCNC: 3.9 MMOL/L — SIGNIFICANT CHANGE UP (ref 3.5–5.3)
RBC # BLD: 5.19 M/UL — SIGNIFICANT CHANGE UP (ref 4.2–5.8)
RBC # FLD: 14.3 % — SIGNIFICANT CHANGE UP (ref 10.3–14.5)
SODIUM SERPL-SCNC: 139 MMOL/L — SIGNIFICANT CHANGE UP (ref 135–145)
WBC # BLD: 9.9 K/UL — SIGNIFICANT CHANGE UP (ref 3.8–10.5)
WBC # FLD AUTO: 9.9 K/UL — SIGNIFICANT CHANGE UP (ref 3.8–10.5)

## 2019-09-04 PROCEDURE — 99223 1ST HOSP IP/OBS HIGH 75: CPT

## 2019-09-04 RX ORDER — DOCUSATE SODIUM 100 MG
100 CAPSULE ORAL
Refills: 0 | Status: DISCONTINUED | OUTPATIENT
Start: 2019-09-04 | End: 2019-09-05

## 2019-09-04 RX ORDER — SENNA PLUS 8.6 MG/1
2 TABLET ORAL AT BEDTIME
Refills: 0 | Status: DISCONTINUED | OUTPATIENT
Start: 2019-09-04 | End: 2019-09-05

## 2019-09-04 RX ORDER — PANTOPRAZOLE SODIUM 20 MG/1
40 TABLET, DELAYED RELEASE ORAL DAILY
Refills: 0 | Status: DISCONTINUED | OUTPATIENT
Start: 2019-09-04 | End: 2019-09-05

## 2019-09-04 RX ADMIN — SENNA PLUS 2 TABLET(S): 8.6 TABLET ORAL at 21:13

## 2019-09-04 RX ADMIN — PANTOPRAZOLE SODIUM 40 MILLIGRAM(S): 20 TABLET, DELAYED RELEASE ORAL at 18:53

## 2019-09-04 RX ADMIN — Medication 100 MILLIGRAM(S): at 18:53

## 2019-09-04 RX ADMIN — ENOXAPARIN SODIUM 40 MILLIGRAM(S): 100 INJECTION SUBCUTANEOUS at 18:54

## 2019-09-04 RX ADMIN — Medication 81 MILLIGRAM(S): at 12:45

## 2019-09-04 RX ADMIN — Medication 180 MILLIGRAM(S): at 05:16

## 2019-09-04 NOTE — PROGRESS NOTE ADULT - ASSESSMENT
· Assessment		  54 year old man with history of PAF admitted with      * Paroxysmal atrial fibrillation.   ruled out ACS    Plan: admit to telemetry  CHADS -1 (HTN)  Cardiology evaluation  Increase Cardizem to 60mg q 6hrs-->    ECHO EF wnl  check TSH 1.6   ASA.  NST in am    *  Hypertension, unspecified type.    Plan: monitor.     * HLD  - statins started    * Epigastric pain can be cardiac in nature  - NST  - add PPI, maalox prn    * Constipation  - laxatives    Dispo - d/c planning  if NST neg

## 2019-09-04 NOTE — PROGRESS NOTE ADULT - SUBJECTIVE AND OBJECTIVE BOX
Subjective:  Patient is a 54y old  Male who presents with a chief complaint of palpitations     HPI:      54 year old man with history fo chiari malformation, paf not on AC, HTN presented to the ED  on 9/2/19 with complaints of palpitations which started this AM. He had a heavy dinner last night and thought it was just indigestion but he was able to feel himself go into AFIB. He has history of Afib in the past but was not started on VKA. Patient is on Cardizem at home for HTN. He took 2 x 81mg aspirin prior to coming to the ED. Denies chest pain, nausea/vomiting, felt a little short of breath when he was having the palpitations.   ED course- Afib with RVR 's given Cardizem IV x2- now rate controlled in the 80's.     9/3 - Patient seen and examined at bedside earlier today, palpitations resolved,  + some epigastric discomfort, denies dyspnea, HA, dizziness, abdominal pain  9/4 - pt seen and examined, + epigastric discomfort, + constipation, denies cp, dyspnea, palpitations, tele - no events on tele   Review of system- Rest of the review of system are negative except mentioned in HPI    OBJECTIVE:   T(C): 36.8 (09-04-19 @ 10:11), Max: 36.8 (09-04-19 @ 10:11)  T(F): 98.2 (09-04-19 @ 10:11), Max: 98.2 (09-04-19 @ 10:11)  HR: 88 (09-04-19 @ 10:11) (73 - 91)  BP: 127/81 (09-04-19 @ 10:11) (118/77 - 127/81)  RR: 18 (09-04-19 @ 10:11) (18 - 18)  SpO2: 100% (09-04-19 @ 10:11) (97% - 100%)  Wt(kg): --    PHYSICAL EXAM:  GENERAL: NAD  NERVOUS SYSTEM:  Alert & Oriented X3, non- focal exam, Motor Strength 5/5 B/L upper and lower extremities; DTRs 2+ intact and symmetric  HEAD:  Atraumatic, Normocephalic  EYES: EOMI, PERRLA, conjunctiva and sclera clear  HEENT: Moist mucous membranes  NECK: Supple, No JVD  CHEST/LUNG: Clear to auscultation bilaterally; No rales, no rhonchi, no wheezing, or rubs  HEART: Regular rate and rhythm; No murmurs, rubs, or gallops  ABDOMEN: Soft, Nontender, Nondistended; Bowel sounds present  GENITOURINARY- Voiding, no suprapubic tenderness  EXTREMITIES:  2+ Peripheral Pulses, No clubbing, cyanosis, or edema  MUSCULOSKELETAL:- No muscle tenderness, Muscle tone normal, No joint tenderness, no Joint swelling, Joint range of motion-normal  SKIN-no rash, no lesion    LABS:             09-04    139  |  106  |  13  ----------------------------<  105<H>  3.9   |  27  |  1.10    Ca    9.1      04 Sep 2019 08:01  Mg     2.1     09-03    Lipid Profile in AM (09.03.19 @ 07:06)    Total Cholesterol/HDL Ratio Measurement: 5.0 RATIO    Cholesterol, Serum: 210 mg/dL    Triglycerides, Serum: 150 mg/dL    HDL Cholesterol, Serum: 42: HDL Levels >/= 60 mg/dL are considered beneficial and a "negative" risk  factor.  Effective 08/15/2018: New reference range and interpretive comment. mg/dL    Direct LDL: 138: LDL Cholesterol (mg/dL) --- Interpretive Comment (for adults 18 and over)  Optimal LDL Level may vary based on clinical situation  Below 70                   Ideal for people at very high risk of heart  disease  Below 100                  Ideal for people at risk of heart disease  100 - 129                    [place]  130 - 159                    Borderline high  160 - 189                    High  190 and Above           Very high mg/dL                              17.2   9.90  )-----------( 219      ( 04 Sep 2019 08:01 )             50.0       Thyroid Stimulating Hormone, Serum in AM (09.03.19 @ 07:06)    Thyroid Stimulating Hormone, Serum: 1.97 uU/mL    CARDIAC MARKERS ( 03 Sep 2019 07:06 )  <0.015 ng/mL / x     / x     / x     / x                                 16.8   11.35 )-----------( 207      ( 03 Sep 2019 07:06 )             48.5     09-03    143  |  107  |  11  ----------------------------<  104<H>  3.7   |  26  |  0.92    Ca    8.6      03 Sep 2019 07:06  Mg     2.1     09-03    TPro  7.6  /  Alb  4.3  /  TBili  0.8  /  DBili  x   /  AST  22  /  ALT  46  /  AlkPhos  50  09-02      CARDIAC MARKERS ( 03 Sep 2019 07:06 )  <0.015 ng/mL / x     / x     / x     / x      CARDIAC MARKERS ( 02 Sep 2019 09:53 )  <0.015 ng/mL / x     / x     / x     / x          < from: 12 Lead ECG (09.03.19 @ 07:47) >    Ventricular Rate 79 BPM    Atrial Rate 250 BPM    QRS Duration 96 ms    Q-T Interval 374 ms    QTC Calculation(Bezet) 428 ms    R Axis 47 degrees    T Axis 29 degrees    Diagnosis Line Atrial fibrillation  Abnormal ECG  When compared with ECG of 02-SEP-2019 11:53,  No significant change was found    < end of copied text >      CAPILLARY BLOOD GLUCOSE            RECENT CULTURES:    RADIOLOGY & ADDITIONAL TESTS:  < from: Transthoracic Echocardiogram (09.03.19 @ 08:13) >   The left ventricle is normal in size, wall thickness, wall motion and   contractility.   Estimated left ventricular ejection fraction is 55-60 %.   Normal appearing left atrium.   Normal appearing right atrium.   Normal appearing right ventricle structure and function.   Normal aortic valve structure and function.   Normal appearing mitral valve structure and function.   Trace mitral regurgitation is present.   Normal appearing tricuspid valve structure and function.   Normal appearing pulmonic valve structure and function.   No evidence of pericardial effusion.   No evidence of pleural effusion.   All visualized extra cardiac structures appears to be normal.    < end of copied text >    < from: Xray Chest 2 Views PA/Lat (09.02.19 @ 12:24) >    INTERPRETATION:  PA and lateral views of the chest. Comparison is made to   08/16/2017     CLINICAL INFORMATION: Chest pain    FINDINGS: The heart size is normal.  The lungs are clear.  The bony   structures are unremarkable.    IMPRESSION: No acute pulmonary disease.      < end of copied text >    Current medications:  acetaminophen   Tablet .. 650 milliGRAM(s) Oral every 6 hours PRN  aspirin  chewable 81 milliGRAM(s) Oral daily  atorvastatin 10 milliGRAM(s) Oral at bedtime  diltiazem    Tablet 60 milliGRAM(s) Oral every 6 hours  enoxaparin Injectable 40 milliGRAM(s) SubCutaneous daily  ondansetron Injectable 4 milliGRAM(s) IV Push every 6 hours PRN

## 2019-09-04 NOTE — PROGRESS NOTE ADULT - SUBJECTIVE AND OBJECTIVE BOX
PCP:    REQUESTING PHYSICIAN:    REASON FOR CONSULT:    CHIEF COMPLAINT:    HPI:  54 year old man with history of chiari malformation, PAF not on AC, HTN presented to the ED with complaints of palpitations which started yesterday  AM. He had a heavy dinner last before night and thought it was just indigestion but he was able to " feel himself" go into AFIB. He has history of Afib in the past but was not started on VKA. Patient is on Cardizem 120 mg at home for HTN. He took 2 x 81mg aspirin prior to coming to the ED. Denies chest pain, nausea/vomiting, felt a little short of breath when he was having the palpitations.   ED course- Afib with RVR 's given Cardizem IV x2- was given cardizem  with improvement in rate  , patient was in atrial fibrillation until this am , when he converted to sinus 9 am.   9/4/19: Pt ambulating without difficulty. He reports abdominal and epigastric discomfort at times. He remains in NSR.         9/4/19:  Pt awake, ambulating and comfortable.     PMH- as above  PSH- spinal surgery for Chiari malformation   FMH- father has DM, also MI in his 60s  Social- denies smoking or drinking (02 Sep 2019 15:30)      PAST MEDICAL & SURGICAL HISTORY:  HTN (hypertension)  Chiari I malformation  Atrial fibrillation, unspecified type  H/O Spinal surgery      SOCIAL HISTORY:    FAMILY HISTORY:  Family history of diabetes mellitus in father      ALLERGIES:  Allergies    cinnamon (Unknown)  penicillins (Hives)  Safflower Oil (Unknown)  shellfish (Unknown)    Intolerances        MEDICATIONS:    MEDICATIONS  (STANDING):  aspirin  chewable 81 milliGRAM(s) Oral daily  atorvastatin 10 milliGRAM(s) Oral at bedtime  diltiazem    milliGRAM(s) Oral daily  enoxaparin Injectable 40 milliGRAM(s) SubCutaneous daily    MEDICATIONS  (PRN):  acetaminophen   Tablet .. 650 milliGRAM(s) Oral every 6 hours PRN Temp greater or equal to 38C (100.4F), Mild Pain (1 - 3)  ondansetron Injectable 4 milliGRAM(s) IV Push every 6 hours PRN Nausea        Vital Signs Last 24 Hrs  T(C): 36.2 (04 Sep 2019 05:03), Max: 37 (03 Sep 2019 17:30)  T(F): 97.2 (04 Sep 2019 05:03), Max: 98.6 (03 Sep 2019 17:30)  HR: 73 (04 Sep 2019 05:03) (73 - 97)  BP: 118/77 (04 Sep 2019 05:03) (118/77 - 127/85)  BP(mean): --  RR: 18 (03 Sep 2019 17:30) (18 - 18)  SpO2: 97% (04 Sep 2019 05:03) (97% - 100%)Daily     Daily I&O's Summary      PHYSICAL EXAM:    Constitutional: NAD, awake and alert, well-developed  HEENT: PERR, EOMI,  No oral cyananosis.  Neck:  supple,  No JVD  Respiratory: Breath sounds are clear bilaterally, No wheezing, rales or rhonchi  Cardiovascular: S1 and S2, regular rate and rhythm, no Murmurs, gallops or rubs  Gastrointestinal: Bowel Sounds present, soft, nontender.   Extremities: No peripheral edema. No clubbing or cyanosis.  Vascular: 2+ peripheral pulses  Neurological: A/O x 3, no focal deficits  Musculoskeletal: no calf tenderness.  Skin: No rashes.      LABS: All Labs Reviewed:                        17.2   9.90  )-----------( 219      ( 04 Sep 2019 08:01 )             50.0                         16.8   11.35 )-----------( 207      ( 03 Sep 2019 07:06 )             48.5                         17.1   8.92  )-----------( 222      ( 02 Sep 2019 09:53 )             48.3     04 Sep 2019 08:01    139    |  106    |  13     ----------------------------<  105    3.9     |  27     |  1.10   03 Sep 2019 07:06    143    |  107    |  11     ----------------------------<  104    3.7     |  26     |  0.92   02 Sep 2019 09:53    146    |  106    |  8      ----------------------------<  116    3.4     |  24     |  1.11     Ca    9.1        04 Sep 2019 08:01  Ca    8.6        03 Sep 2019 07:06  Ca    9.2        02 Sep 2019 09:53  Mg     2.1       03 Sep 2019 07:06    TPro  7.6    /  Alb  4.3    /  TBili  0.8    /  DBili  x      /  AST  22     /  ALT  46     /  AlkPhos  50     02 Sep 2019 09:53      CARDIAC MARKERS ( 03 Sep 2019 07:06 )  <0.015 ng/mL / x     / x     / x     / x          Blood Culture:   09-02 @ 09:53  Pro Bnp 7    09-03 @ 07:06  TSH: 1.97      RADIOLOGY/EKG:< from: 12 Lead ECG (09.03.19 @ 07:47) >  Diagnosis Line Atrial fibrillation  Abnormal ECG  When compared with ECG of 02-SEP-2019 11:53,  No significant change was found  Confirmed by SEUN CARRANZA MDNETH ([id number]) on 9/3/2019 10:22:22 AM    < end of copied text >        ECHO/CARDIAC CATHTERIZATION/STRESS TEST:  < from: Transthoracic Echocardiogram (09.03.19 @ 08:13) >   Impression     Summary     The left ventricle is normal in size, wall thickness, wall motion and   contractility.   Estimated left ventricular ejection fraction is 55-60 %.   Normal appearing left atrium.   Normal appearing right atrium.   Normal appearing right ventricle structure and function.   Normal aortic valve structure and function.   Normal appearing mitral valve structure and function.   Trace mitral regurgitation is present.   Normal appearing tricuspid valve structure and function.   Normal appearing pulmonic valve structure and function.   No evidence of pericardial effusion.   No evidence of pleural effusion.   All visualized extra cardiac structures appears to be normal.     Signature     ----------------------------------------------------------------   Electronically signed by Vince Pollock MD(Interpreting   physician) on 09/03/2019 08:26 PM   ----------------------------------------------------------------    < end of copied text >

## 2019-09-05 ENCOUNTER — TRANSCRIPTION ENCOUNTER (OUTPATIENT)
Age: 54
End: 2019-09-05

## 2019-09-05 VITALS
TEMPERATURE: 98 F | DIASTOLIC BLOOD PRESSURE: 77 MMHG | OXYGEN SATURATION: 99 % | RESPIRATION RATE: 18 BRPM | HEART RATE: 101 BPM | SYSTOLIC BLOOD PRESSURE: 129 MMHG

## 2019-09-05 LAB
CK SERPL-CCNC: 72 U/L — SIGNIFICANT CHANGE UP (ref 26–308)
HBA1C BLD-MCNC: 5.4 % — SIGNIFICANT CHANGE UP (ref 4–5.6)
NT-PROBNP SERPL-SCNC: 81 PG/ML — SIGNIFICANT CHANGE UP (ref 0–125)
TROPONIN I SERPL-MCNC: <0.015 NG/ML — SIGNIFICANT CHANGE UP (ref 0.01–0.04)

## 2019-09-05 PROCEDURE — 78452 HT MUSCLE IMAGE SPECT MULT: CPT | Mod: 26

## 2019-09-05 PROCEDURE — 99233 SBSQ HOSP IP/OBS HIGH 50: CPT

## 2019-09-05 PROCEDURE — 93010 ELECTROCARDIOGRAM REPORT: CPT

## 2019-09-05 RX ORDER — ATORVASTATIN CALCIUM 80 MG/1
1 TABLET, FILM COATED ORAL
Qty: 30 | Refills: 0
Start: 2019-09-05 | End: 2019-10-04

## 2019-09-05 RX ORDER — DILTIAZEM HCL 120 MG
1 CAPSULE, EXT RELEASE 24 HR ORAL
Qty: 0 | Refills: 0 | DISCHARGE

## 2019-09-05 RX ORDER — ASPIRIN/CALCIUM CARB/MAGNESIUM 324 MG
1 TABLET ORAL
Qty: 30 | Refills: 0
Start: 2019-09-05 | End: 2019-10-04

## 2019-09-05 RX ORDER — SENNA PLUS 8.6 MG/1
2 TABLET ORAL
Qty: 60 | Refills: 0
Start: 2019-09-05 | End: 2019-10-04

## 2019-09-05 RX ORDER — DOCUSATE SODIUM 100 MG
1 CAPSULE ORAL
Qty: 60 | Refills: 0
Start: 2019-09-05 | End: 2019-10-04

## 2019-09-05 RX ORDER — PANTOPRAZOLE SODIUM 20 MG/1
1 TABLET, DELAYED RELEASE ORAL
Qty: 30 | Refills: 0
Start: 2019-09-05 | End: 2019-10-04

## 2019-09-05 RX ORDER — DILTIAZEM HCL 120 MG
1 CAPSULE, EXT RELEASE 24 HR ORAL
Qty: 30 | Refills: 0
Start: 2019-09-05 | End: 2019-10-04

## 2019-09-05 RX ADMIN — Medication 180 MILLIGRAM(S): at 12:09

## 2019-09-05 RX ADMIN — Medication 100 MILLIGRAM(S): at 12:09

## 2019-09-05 RX ADMIN — PANTOPRAZOLE SODIUM 40 MILLIGRAM(S): 20 TABLET, DELAYED RELEASE ORAL at 12:09

## 2019-09-05 RX ADMIN — Medication 81 MILLIGRAM(S): at 12:09

## 2019-09-05 NOTE — PROGRESS NOTE ADULT - PROBLEM SELECTOR PLAN 4
Contacted pt's cardiologist office, Dr. Rios. ETT non ischemic today
Contacted pt's cardiologist office, Dr. Rios. No history of ETT. Will order ETT tomorrow.

## 2019-09-05 NOTE — PROGRESS NOTE ADULT - SUBJECTIVE AND OBJECTIVE BOX
PCP:    REQUESTING PHYSICIAN:    REASON FOR CONSULT:    CHIEF COMPLAINT:    HPI:  54 year old man with history of chiari malformation, PAF not on AC, HTN presented to the ED with complaints of palpitations which started yesterday  AM. He had a heavy dinner last before night and thought it was just indigestion but he was able to " feel himself" go into AFIB. He has history of Afib in the past but was not started on VKA. Patient is on Cardizem 120 mg at home for HTN. He took 2 x 81mg aspirin prior to coming to the ED. Denies chest pain, nausea/vomiting, felt a little short of breath when he was having the palpitations.   ED course- Afib with RVR 's given Cardizem IV x2- was given cardizem  with improvement in rate  , patient was in atrial fibrillation until this am , when he converted to sinus 9 am.   9/3/19: Pt ambulating without difficulty. He reports abdominal and epigastric discomfort at times. He remains in NSR.         9/4/19:  Pt awake, ambulating and comfortable.   9/5/19: Pt ambulating. S/P ETT non ischemic    PMH- as above  PSH- spinal surgery for Chiari malformation   FMH- father has DM, also MI in his 60s  Social- denies smoking or drinking (02 Sep 2019 15:30)      PAST MEDICAL & SURGICAL HISTORY:  HTN (hypertension)  Chiari I malformation  Atrial fibrillation, unspecified type  H/O Spinal surgery      SOCIAL HISTORY:    FAMILY HISTORY:  Family history of diabetes mellitus in father      ALLERGIES:  Allergies    cinnamon (Unknown)  penicillins (Hives)  Safflower Oil (Unknown)  shellfish (Unknown)    Intolerances        MEDICATIONS:    MEDICATIONS  (STANDING):  aspirin  chewable 81 milliGRAM(s) Oral daily  atorvastatin 10 milliGRAM(s) Oral at bedtime  diltiazem    milliGRAM(s) Oral daily  docusate sodium 100 milliGRAM(s) Oral two times a day  enoxaparin Injectable 40 milliGRAM(s) SubCutaneous daily  pantoprazole    Tablet 40 milliGRAM(s) Oral daily  senna 2 Tablet(s) Oral at bedtime    MEDICATIONS  (PRN):  acetaminophen   Tablet .. 650 milliGRAM(s) Oral every 6 hours PRN Temp greater or equal to 38C (100.4F), Mild Pain (1 - 3)  aluminum hydroxide/magnesium hydroxide/simethicone Suspension 30 milliLiter(s) Oral every 6 hours PRN Dyspepsia  ondansetron Injectable 4 milliGRAM(s) IV Push every 6 hours PRN Nausea      REVIEW OF SYSTEMS:    CONSTITUTIONAL: No weakness, fevers or chills  EYES/ENT: No visual changes;  No vertigo or throat pain   NECK: No pain or stiffness  RESPIRATORY: No cough, wheezing, hemoptysis; No shortness of breath  CARDIOVASCULAR: No chest pain or palpitations  GASTROINTESTINAL: No abdominal or epigastric pain. No nausea, vomiting, or hematemesis; No diarrhea or constipation. No melena or hematochezia.  GENITOURINARY: No dysuria, frequency or hematuria  NEUROLOGICAL: No numbness or weakness  SKIN: No itching, burning, rashes, or lesions   All other review of systems is negative unless indicated above    Vital Signs Last 24 Hrs  T(C): 36.6 (05 Sep 2019 11:51), Max: 36.7 (04 Sep 2019 17:35)  T(F): 97.9 (05 Sep 2019 11:51), Max: 98.1 (04 Sep 2019 17:35)  HR: 101 (05 Sep 2019 11:51) (82 - 101)  BP: 129/77 (05 Sep 2019 11:51) (121/73 - 129/77)  BP(mean): --  RR: 18 (05 Sep 2019 11:51) (18 - 18)  SpO2: 99% (05 Sep 2019 11:51) (98% - 99%)Daily     Daily I&O's Summary      PHYSICAL EXAM:    Constitutional: NAD, awake and alert, well-developed  HEENT: PERR, EOMI,  No oral cyananosis.  Neck:  supple,  No JVD  Respiratory: Breath sounds are clear bilaterally, No wheezing, rales or rhonchi  Cardiovascular: S1 and S2, regular rate and rhythm, no Murmurs, gallops or rubs  Gastrointestinal: Bowel Sounds present, soft, nontender.   Extremities: No peripheral edema. No clubbing or cyanosis.  Vascular: 2+ peripheral pulses  Neurological: A/O x 3, no focal deficits  Musculoskeletal: no calf tenderness.  Skin: No rashes.      LABS: All Labs Reviewed:                        17.2   9.90  )-----------( 219      ( 04 Sep 2019 08:01 )             50.0                         16.8   11.35 )-----------( 207      ( 03 Sep 2019 07:06 )             48.5     04 Sep 2019 08:01    139    |  106    |  13     ----------------------------<  105    3.9     |  27     |  1.10   03 Sep 2019 07:06    143    |  107    |  11     ----------------------------<  104    3.7     |  26     |  0.92     Ca    9.1        04 Sep 2019 08:01  Ca    8.6        03 Sep 2019 07:06  Mg     2.1       03 Sep 2019 07:06        CARDIAC MARKERS ( 05 Sep 2019 07:20 )  <0.015 ng/mL / x     / 72 U/L / x     / x          Blood Culture:   09-05 @ 07:20  Pro Bnp 81    09-03 @ 07:06  TSH: 1.97      RADIOLOGY/EKG:      ECHO/CARDIAC CATHTERIZATION/STRESS TEST:

## 2019-09-05 NOTE — DISCHARGE NOTE PROVIDER - CARE PROVIDER_API CALL
Yoel Rios)  Cardiovascular Disease; Internal Medicine  [address] [place] [address], [place] [address]  Phone: [phone]  Fax: [phone]  Follow Up Time:

## 2019-09-05 NOTE — DISCHARGE NOTE NURSING/CASE MANAGEMENT/SOCIAL WORK - PATIENT PORTAL LINK FT
You can access the FollowMyHealth Patient Portal offered by [facility] by registering at the following website: [email or web address]. By joining [facility]’s FollowMyHealth portal, you will also be able to view your health information using other applications (apps) compatible with our system.

## 2019-09-05 NOTE — DISCHARGE NOTE PROVIDER - NSDCFUADDINST_GEN_ALL_CORE_FT
IMPRESSION: Normal SPECT Myocardial Perfusion Imaging.    No scan evidence of reversible or fixed perfusion defects.    Normal left ventricular contractility with an ejection fraction of 69%   (Normal: 50% or greater).    No regional wall motion abnormalities.    Please refer to cardiac stress test report for maximum heart rate   achieved, EKG findings and symptoms during the procedure.      < end of copied text >     The left ventricle is normal in size, wall thickness, wall motion and   contractility.   Estimated left ventricular ejection fraction is 55-60 %.   Normal appearing left atrium.   Normal appearing right atrium.   Normal appearing right ventricle structure and function.   Normal aortic valve structure and function.   Normal appearing mitral valve structure and function.   Trace mitral regurgitation is present.   Normal appearing tricuspid valve structure and function.   Normal appearing pulmonic valve structure and function.   No evidence of pericardial effusion.   No evidence of pleural effusion.   All visualized extra cardiac structures appears to be normal.    < end of copied text >    Lipid Profile in AM (09.03.19 @ 07:06)    Total Cholesterol/HDL Ratio Measurement: 5.0 RATIO    Cholesterol, Serum: 210 mg/dL    Triglycerides, Serum: 150 mg/dL    HDL Cholesterol, Serum: 42: HDL Levels >/= 60 mg/dL are considered beneficial and a "negative" risk  factor.  Effective 08/15/2018: New reference range and interpretive comment. mg/dL    Direct LDL: 138: LDL Cholesterol (mg/dL) --- Interpretive Comment (for adults 18 and over)  Optimal LDL Level may vary based on clinical situation  Below 70                   Ideal for people at very high risk of heart  disease  Below 100                  Ideal for people at risk of heart disease  100 - 129                    [place]  130 - 159                    Borderline high  160 - 189                    High  190 and Above           Very high mg/dL  Troponin I, Serum (09.05.19 @ 07:20)    Troponin I, Serum: <0.015: High Sensitivity Troponin and new reference  range effective 7/6/2016 ng/mL

## 2019-09-05 NOTE — DISCHARGE NOTE PROVIDER - NSDCCPCAREPLAN_GEN_ALL_CORE_FT
PRINCIPAL DISCHARGE DIAGNOSIS  Diagnosis: Atrial fibrillation  Assessment and Plan of Treatment: take cardizem, aspirin, follow up with cardiologist within 1 week      SECONDARY DISCHARGE DIAGNOSES  Diagnosis: Epigastric pain  Assessment and Plan of Treatment: take pantoprazole, follow up with GI specialist if pain persist in 1-2 weeks    Diagnosis: Hyperlipidemia  Assessment and Plan of Treatment: take statins, repeat liver function test in 1 week,  follwo up with PCP within 1 week for further monitoring

## 2019-09-05 NOTE — DISCHARGE NOTE PROVIDER - HOSPITAL COURSE
Patient is a 54y old  Male who presents with a chief complaint of palpitations         HPI:        54 year old man with history fo chiari malformation, paf not on AC, HTN presented to the ED  on 9/2/19 with complaints of palpitations which started this AM. He had a heavy dinner last night and thought it was just indigestion but he was able to feel himself go into AFIB. He has history of Afib in the past but was not started on VKA. Patient is on Cardizem at home for HTN. He took 2 x 81mg aspirin prior to coming to the ED. Denies chest pain, nausea/vomiting, felt a little short of breath when he was having the palpitations.     ED course- Afib with RVR 's given Cardizem IV x2- now rate controlled in the 80's.         9/3 - Patient seen and examined at bedside earlier today, palpitations resolved,  + some epigastric discomfort, denies dyspnea, HA, dizziness, abdominal pain    9/4 - pt seen and examined, + epigastric discomfort, + constipation, denies cp, dyspnea, palpitations, tele - no events on tele     9/5 - pt seen and examined, epigastric pain persist, denies cp, dyspnea, palpitations, afebrile    pt does not want to take statins     Review of system- Rest of the review of system are negative except mentioned in HPI    T(C): 36.6 (09-05-19 @ 11:51), Max: 36.7 (09-04-19 @ 17:35)    T(F): 97.9 (09-05-19 @ 11:51), Max: 98.1 (09-04-19 @ 17:35)    HR: 101 (09-05-19 @ 11:51) (82 - 101)    BP: 129/77 (09-05-19 @ 11:51) (121/73 - 129/77)    RR: 18 (09-05-19 @ 11:51) (18 - 18)    SpO2: 99% (09-05-19 @ 11:51) (98% - 99%)    Wt(kg): --    NERVOUS SYSTEM:  Alert & Oriented X3, non- focal exam, Motor Strength 5/5 B/L upper and lower extremities; DTRs 2+ intact and symmetric    HEAD:  Atraumatic, Normocephalic    EYES: EOMI, PERRLA, conjunctiva and sclera clear    HEENT: Moist mucous membranes    NECK: Supple, No JVD    CHEST/LUNG: Clear to auscultation bilaterally; No rales, no rhonchi, no wheezing, or rubs    HEART: Regular rate and rhythm; No murmurs, rubs, or gallops    ABDOMEN: Soft, Nontender, Nondistended; Bowel sounds present    GENITOURINARY- Voiding, no suprapubic tenderness    EXTREMITIES:  2+ Peripheral Pulses, No clubbing, cyanosis, or edema    MUSCULOSKELETAL:- No muscle tenderness, Muscle tone normal, No joint tenderness, no Joint swelling, Joint range of motion-normal    SKIN-no rash, no lesion        · Assessment    54 year old man with history of PAF admitted with      * Paroxysmal atrial fibrillation.     ruled out ACS      Plan: admit to telemetry    CHADS -1 (HTN)    Cardiology evaluation    Increase Cardizem to 60mg q 6hrs-->      ECHO EF wnl    check TSH 1.6     ASA.    NST  neg         *  Hypertension, unspecified type.      Plan: monitor.         * HLD    - statins started        * Epigastric pain can be cardiac in nature    - NST    - trial of PPI for 2 weeks ,  maalox prn    - outpatient GI work-up if pain persist         * Constipation    - laxatives        Disposition - medically optimized to be discharged home with close follow up with PCP  and cardiologist within 1 week     return to ED if fever, abdominal pain, nausea, vomiting, chest pain, dyspnea    Discharge plan discussed with patient, RN    Patient advised to follow up with PCP within 3-7 days    time spend 40 min    Discharge note faxed to PCP with my contact information to call me back     PCP Dr. Latif

## 2019-09-10 DIAGNOSIS — K59.00 CONSTIPATION, UNSPECIFIED: ICD-10-CM

## 2019-09-10 DIAGNOSIS — Z79.82 LONG TERM (CURRENT) USE OF ASPIRIN: ICD-10-CM

## 2019-09-10 DIAGNOSIS — Z88.0 ALLERGY STATUS TO PENICILLIN: ICD-10-CM

## 2019-09-10 DIAGNOSIS — G93.5 COMPRESSION OF BRAIN: ICD-10-CM

## 2019-09-10 DIAGNOSIS — E78.5 HYPERLIPIDEMIA, UNSPECIFIED: ICD-10-CM

## 2019-09-10 DIAGNOSIS — E66.9 OBESITY, UNSPECIFIED: ICD-10-CM

## 2019-09-10 DIAGNOSIS — I48.0 PAROXYSMAL ATRIAL FIBRILLATION: ICD-10-CM

## 2019-09-10 DIAGNOSIS — I10 ESSENTIAL (PRIMARY) HYPERTENSION: ICD-10-CM

## 2019-10-23 ENCOUNTER — TRANSCRIPTION ENCOUNTER (OUTPATIENT)
Age: 54
End: 2019-10-23

## 2019-10-24 NOTE — ED ADULT NURSE NOTE - NSSUSCREENINGQ5_ED_ALL_ED
Pt to ED for abd pain and constipation. After visiting PCP patient had elevated pulse and was referred to ED.   No

## 2019-10-29 ENCOUNTER — EMERGENCY (EMERGENCY)
Facility: HOSPITAL | Age: 54
LOS: 1 days | Discharge: HOME | End: 2019-10-29
Attending: EMERGENCY MEDICINE
Payer: COMMERCIAL

## 2019-10-29 ENCOUNTER — TRANSCRIPTION ENCOUNTER (OUTPATIENT)
Age: 54
End: 2019-10-29

## 2019-10-29 VITALS
OXYGEN SATURATION: 96 % | SYSTOLIC BLOOD PRESSURE: 132 MMHG | DIASTOLIC BLOOD PRESSURE: 83 MMHG | WEIGHT: 285.94 LBS | RESPIRATION RATE: 18 BRPM | HEART RATE: 88 BPM | HEIGHT: 74 IN | TEMPERATURE: 98 F

## 2019-10-29 DIAGNOSIS — Z98.890 OTHER SPECIFIED POSTPROCEDURAL STATES: Chronic | ICD-10-CM

## 2019-10-29 PROCEDURE — 99284 EMERGENCY DEPT VISIT MOD MDM: CPT

## 2019-10-29 NOTE — ED ADULT TRIAGE NOTE - CHIEF COMPLAINT QUOTE
took first dose of doxycycline hyclate 100 g prescribed by urgent care for pneumonia; feel itchiness on torso and face, muscle tightness, mild eyelid swelling; warm sensation on ears; hx Chiari malformation; no acute distress

## 2019-10-30 VITALS
OXYGEN SATURATION: 96 % | HEART RATE: 87 BPM | TEMPERATURE: 98 F | DIASTOLIC BLOOD PRESSURE: 88 MMHG | RESPIRATION RATE: 18 BRPM | SYSTOLIC BLOOD PRESSURE: 134 MMHG

## 2019-10-30 PROBLEM — I10 ESSENTIAL (PRIMARY) HYPERTENSION: Chronic | Status: ACTIVE | Noted: 2019-09-02

## 2019-10-30 PROCEDURE — 94640 AIRWAY INHALATION TREATMENT: CPT

## 2019-10-30 PROCEDURE — 99284 EMERGENCY DEPT VISIT MOD MDM: CPT | Mod: 25

## 2019-10-30 RX ORDER — AZITHROMYCIN 500 MG/1
2 TABLET, FILM COATED ORAL
Qty: 2 | Refills: 0
Start: 2019-10-30 | End: 2019-10-30

## 2019-10-30 RX ORDER — AZITHROMYCIN 500 MG/1
1 TABLET, FILM COATED ORAL
Qty: 4 | Refills: 0
Start: 2019-10-30 | End: 2019-11-02

## 2019-10-30 RX ORDER — IPRATROPIUM/ALBUTEROL SULFATE 18-103MCG
3 AEROSOL WITH ADAPTER (GRAM) INHALATION ONCE
Refills: 0 | Status: COMPLETED | OUTPATIENT
Start: 2019-10-30 | End: 2019-10-30

## 2019-10-30 RX ORDER — ACETAMINOPHEN 500 MG
975 TABLET ORAL ONCE
Refills: 0 | Status: COMPLETED | OUTPATIENT
Start: 2019-10-30 | End: 2019-10-30

## 2019-10-30 RX ORDER — CEFUROXIME AXETIL 250 MG
1 TABLET ORAL
Qty: 14 | Refills: 0
Start: 2019-10-30 | End: 2019-11-05

## 2019-10-30 RX ADMIN — Medication 3 MILLILITER(S): at 01:39

## 2019-10-30 RX ADMIN — Medication 975 MILLIGRAM(S): at 01:40

## 2019-10-30 NOTE — ED PROVIDER NOTE - CLINICAL SUMMARY MEDICAL DECISION MAKING FREE TEXT BOX
Attending MD Bae: 54M presenting with body itching, subjective eye swelling and facial swelling after taking doxycycline for presumed PNA x 1. Exam reveals no rash, no objective e/o allergic reaction but difficult to exclude reaction entirely. Will observe in ED shortly, I reviewed urgent care CXR and there is not convincing infiltrate by my read of CXR so will discuss with patient potential of observing off antibiotics for now vs switching to ceftin/azithro Plastic surgeon on call Dr. Kika Parker () reviewed the pt's films. States he will need surgery, will see him tomorrow morning, recs placing the pt in an ulnar gutter splint until then. Vernon: slit lamp shows small corneal abrasion at 4 oclock, and mild cells and flare appearance.  Plan for topical abx and close ophtho followup.  Xray shows rt midshaft 4th MCP fx.  Plan for ulnar gutter ssplint and close hand followup.  Will be d/rebekah home Vernon: slit lamp shows small corneal abrasion at 4 oclock, and mild cells and flare appearance.  Plan for topical abx and close ophtho followup.  Xray shows rt midshaft 4th MCP fx.  Plan for ulnar gutter splint and close hand followup.  Will be d/rebekah home Spoke to Dr. Dominguez of opt, who will see the pt tomorrow at 1 PM.

## 2019-10-30 NOTE — ED PROVIDER NOTE - OBJECTIVE STATEMENT
54M presenting for evaluation of body itching and facial swelling after taking first dose of oral doxycycline for presumed pneumonia. The patient underwent a chest x-ray today at urgent care after having 2-3 days of productive cough and was told he may have pneumonia so doxycycline was prescribed. He has no previous known reaction to this medication however he is not sure if he has taken it before. He thinks his eyelids are swollen, no difficulty breathing, no tongue swelling, no rashes that he can see but he is itchy. He has not taken anything for the symptoms as of yet. No ho anaphylaxis

## 2019-10-30 NOTE — ED PROVIDER NOTE - PHYSICAL EXAMINATION
Attending MD Bae:    Gen:  Well appearing, NAD, oriented x 3  HEENT: no tongue swelling, uvula wnl, no stridor, no appreciable facial or lip swelling  Neck: supple, no swelling, trachea midline  CV: heart with reg rhythm, no obvious murmur appreciated   Resp: CTAB, breathing comfortably  Abd: soft, NT, ND  Extremities: extremities warm to the touch, no peripheral edema   Msk: no extremity deformities or bony tenderness  Pysch: appropriate affect    Neuro: moves all extremities spontaneously, no gross motor or sensory deficits

## 2019-10-30 NOTE — ED PROVIDER NOTE - NSFOLLOWUPINSTRUCTIONS_ED_ALL_ED_FT
Please stop taking the doxycyline.     Return to the ED for throat tightness, tongue swelling or difficulty breathing.     Please see your regular doctor in 1-2 days to ensure improvement. 2 antibiotics were sent to your pharmacy, only fill the prescription if you have not improved in 2 days.

## 2019-10-30 NOTE — ED ADULT NURSE NOTE - CHPI ED NUR SYMPTOMS NEG
no difficulty swallowing/no rash/no throat itching/no vomiting/no difficulty breathing/no shortness of breath/no nausea/no swelling of face, tongue/post pneumonia

## 2019-10-30 NOTE — ED PROVIDER NOTE - PATIENT PORTAL LINK FT
You can access the FollowMyHealth Patient Portal offered by [facility] by registering at the following website: [email or web address]. By joining [employer]’s FollowMyHealth portal, you will also be able to view your health information using other applications (apps) compatible with our system.

## 2019-10-30 NOTE — ED ADULT NURSE NOTE - OBJECTIVE STATEMENT
Pt 55 y/o male with PMH     via walk in presenting with complaints of  for amount of time as per .    Pt denies urinary symptoms. Last BM:     2017.   Pt Axox4, gross neuro intact, PERRL mm. Lungs clear throughout bilateral. S1S2 heard. Abdomen soft, non-tender, non-distended. Skin warm, dry, and intact. Safety and comfort measures maintained. family present at bedside. Pt 53 y/o male with PMH Atrial fibrillation, unspecified type  Chiari I malformation HTN (hypertension) via walk in presenting with complaints of allergic reaction to antibiotics. He states he thinks it is from the doxycycline hyclate 100 mg and felt like he was having an allergic reaction to that. Pt states "hes feeling slight dizziness and feels weird in his eyes." "He states that he feels weird from the Chiari malformation so it is hard to differentiate the symptoms." Denies swelling of the tongue or throat. SOB, chest pain, fever, chills.  Pt Axox4, Slight redness upon the trunk of the skin. Skin warm, dry, intact. Safety and comfort measures maintained. Pt 53 y/o male with PMH Atrial fibrillation, unspecified type  Chiari I malformation HTN (hypertension) via walk in presenting with complaints of allergic reaction to antibiotics. He states he thinks it is from the doxycycline hyclate 100 mg and felt like he was having an allergic reaction to that. Pt states "hes feeling slight dizziness& headache and feels weird in his eyes."He states that he feels weird from the Chiari malformation so it is hard to differentiate the symptoms." Denies swelling of the tongue or throat. SOB, chest pain, fever, chills.  Pt Axox4, Slight redness upon the trunk of the skin. Skin warm, dry, intact. Safety and comfort measures maintained.

## 2020-02-03 ENCOUNTER — APPOINTMENT (OUTPATIENT)
Dept: ORTHOPEDIC SURGERY | Facility: CLINIC | Age: 55
End: 2020-02-03
Payer: COMMERCIAL

## 2020-02-03 VITALS
HEIGHT: 74 IN | HEART RATE: 76 BPM | WEIGHT: 297 LBS | DIASTOLIC BLOOD PRESSURE: 91 MMHG | SYSTOLIC BLOOD PRESSURE: 136 MMHG | BODY MASS INDEX: 38.12 KG/M2

## 2020-02-03 DIAGNOSIS — M65.4 RADIAL STYLOID TENOSYNOVITIS [DE QUERVAIN]: ICD-10-CM

## 2020-02-03 PROCEDURE — 99214 OFFICE O/P EST MOD 30 MIN: CPT

## 2020-11-25 ENCOUNTER — NON-APPOINTMENT (OUTPATIENT)
Age: 55
End: 2020-11-25

## 2020-11-25 ENCOUNTER — APPOINTMENT (OUTPATIENT)
Dept: UROLOGY | Facility: CLINIC | Age: 55
End: 2020-11-25
Payer: COMMERCIAL

## 2020-11-25 VITALS
BODY MASS INDEX: 40.43 KG/M2 | HEART RATE: 96 BPM | WEIGHT: 315 LBS | SYSTOLIC BLOOD PRESSURE: 150 MMHG | OXYGEN SATURATION: 97 % | DIASTOLIC BLOOD PRESSURE: 95 MMHG | HEIGHT: 74 IN

## 2020-11-25 DIAGNOSIS — N50.812 LEFT TESTICULAR PAIN: ICD-10-CM

## 2020-11-25 DIAGNOSIS — R31.21 ASYMPTOMATIC MICROSCOPIC HEMATURIA: ICD-10-CM

## 2020-11-25 LAB
BILIRUB UR QL STRIP: NORMAL
GLUCOSE UR-MCNC: NORMAL
HCG UR QL: 1 EU/DL
HGB UR QL STRIP.AUTO: NORMAL
KETONES UR-MCNC: NORMAL
LEUKOCYTE ESTERASE UR QL STRIP: NORMAL
NITRITE UR QL STRIP: NORMAL
PH UR STRIP: 7
PROT UR STRIP-MCNC: NORMAL
SP GR UR STRIP: 1.02

## 2020-11-25 PROCEDURE — 99244 OFF/OP CNSLTJ NEW/EST MOD 40: CPT

## 2020-11-25 PROCEDURE — 81003 URINALYSIS AUTO W/O SCOPE: CPT | Mod: QW

## 2020-11-25 RX ORDER — DOXYCYCLINE 100 MG/1
100 TABLET, FILM COATED ORAL
Qty: 20 | Refills: 0 | Status: ACTIVE | COMMUNITY
Start: 2020-11-25 | End: 1900-01-01

## 2020-11-25 RX ORDER — SULFAMETHOXAZOLE AND TRIMETHOPRIM 800; 160 MG/1; MG/1
800-160 TABLET ORAL TWICE DAILY
Qty: 20 | Refills: 3 | Status: ACTIVE | COMMUNITY
Start: 2020-11-25 | End: 1900-01-01

## 2020-11-25 NOTE — HISTORY OF PRESENT ILLNESS
[FreeTextEntry1] : This patient presents with a complaint of left testicular pain for a week. He states it started abruptly. He also notes an episode hematospermia and his urine dipstick is positive for blood. He denies any voiding problems but notes some pain at the tip of his penis. He also has history of kidney stones.

## 2020-11-25 NOTE — REVIEW OF SYSTEMS
[Earache] : earache [Sore Throat] : sore throat [Palpitations] : palpitations [Abdominal Pain] : abdominal pain [Constipation] : constipation [Diarrhea] : diarrhea [Heartburn] : heartburn [History of kidney stones] : history of kidney stones [Wake up at night to urinate  How many times?  ___] : wakes up to urinate [unfilled] times during the night [Strong urge to urinate] : strong urge to urinate [Joint Pain] : joint pain [Joint Swelling] : joint swelling [Dizziness] : dizziness [Negative] : Heme/Lymph

## 2020-11-25 NOTE — ASSESSMENT
[FreeTextEntry1] : Labs, CAT scan and scrotal ultrasound are ordered. He will be placed on antibiotics. He will return to the office for a prostate ultrasound and cystoscopy.

## 2020-11-27 ENCOUNTER — LABORATORY RESULT (OUTPATIENT)
Age: 55
End: 2020-11-27

## 2020-11-27 LAB
ANION GAP SERPL CALC-SCNC: 12 MMOL/L
APPEARANCE: CLEAR
BACTERIA UR CULT: NORMAL
BACTERIA: NEGATIVE
BILIRUBIN URINE: NEGATIVE
BLOOD URINE: NEGATIVE
BUN SERPL-MCNC: 11 MG/DL
CALCIUM SERPL-MCNC: 9.6 MG/DL
CHLORIDE SERPL-SCNC: 104 MMOL/L
CO2 SERPL-SCNC: 25 MMOL/L
COLOR: YELLOW
CREAT SERPL-MCNC: 1.13 MG/DL
GLUCOSE QUALITATIVE U: NEGATIVE
GLUCOSE SERPL-MCNC: 114 MG/DL
HYALINE CASTS: 0 /LPF
KETONES URINE: NEGATIVE
LEUKOCYTE ESTERASE URINE: NEGATIVE
MICROSCOPIC-UA: NORMAL
NITRITE URINE: NEGATIVE
PH URINE: 7
POTASSIUM SERPL-SCNC: 4.5 MMOL/L
PROTEIN URINE: NEGATIVE
PSA SERPL-MCNC: 1.04 NG/ML
RED BLOOD CELLS URINE: 2 /HPF
SODIUM SERPL-SCNC: 142 MMOL/L
SPECIFIC GRAVITY URINE: 1.02
SQUAMOUS EPITHELIAL CELLS: 0 /HPF
UROBILINOGEN URINE: NORMAL
WHITE BLOOD CELLS URINE: 0 /HPF

## 2020-11-30 LAB — URINE CYTOLOGY: NORMAL

## 2020-11-30 NOTE — ED ADULT NURSE NOTE - OBJECTIVE STATEMENT
November 30, 2020       [facility], [address] [address]  [address]      Patient: Junie Scales   YOB: 2012   Date of Visit: 11/30/2020       Dear Dr. Eleazar Castillo:    I saw your patient, Ezekiel Barnes, for an evaluation. Below are my notes for this visit with him. If you have questions, please do not hesitate to call me. Sincerely,        Dave Baker MD        CC: No Recipients  Dave Baker MD  11/30/2020  9:50 AM  Signed  Pediatric Pulmonary Medicine Return Visit     Name: Junie Scales                        YOB: 2012      Patient ID: [id number]           Age: 4  Yrs 2  Mos  Date of Service:  11/30/2020                   Accompanied by: with Mother      Socorro Severs was last seen on 6/15/2020. At that time recommendations included:    1- Flovent 44 mcg 2 puffs twice a day with mask/spacer  2- Albuterol as needed per the asthma action plan  3- Stop montelukast   4- Epi pen as needed for allergic reaction   5- Claritin every day until July 1 and then as needed  6- Return in 6 months    Patient is doing well off th montelukast.  Claritin is being used every day for the summer and fall. He is on Flovent 2 puffs twice a day . He has rarely needed albuterol. Once every few weeks mostly with activity. No usual night  time cough. No snoring. Since the last visit patient has not been hospitalized or visited the ER. He has not required any steroids or antibiotics. No accidental food exposure or EPI pen use. RESPIRATORY SUPPORT:  none    Review of Systems   Constitutional: Negative for activity change, appetite change, fatigue and unexpected weight change. HENT: Negative for congestion, nosebleeds, postnasal drip, sinus pressure, sinus pain and sneezing. Eyes: Negative for itching. Respiratory: Negative for apnea, cough, choking, chest tightness, shortness of breath, wheezing and stridor.     Cardiovascular: Negative for chest pain and palpitations. Gastrointestinal: Negative for abdominal pain, constipation, diarrhea, nausea and vomiting. Endocrine: Negative. Genitourinary: Negative. Musculoskeletal: Negative. Skin: Negative. Allergic/Immunologic: Negative for environmental allergies and food allergies. Neurological: Negative. Hematological: Does not bruise/bleed easily. Psychiatric/Behavioral: Negative. PAST MEDICAL HISTORY:   No past medical history on file. SURGICAL HISTORY:   No past surgical history on file. FAMILY HISTORY:   family history includes Asthma in his brother and maternal uncle; Cancer in his maternal aunt and maternal grandmother; Patient is unaware of any medical problems in his father.     SOCIAL HISTORY:   Social History     Socioeconomic History   â¢ Marital status: Single     Spouse name: Not on file   â¢ Number of children: Not on file   â¢ Years of education: Not on file   â¢ Highest education level: Not on file   Occupational History   â¢ Not on file   Social Needs   â¢ Financial resource strain: Not on file   â¢ Food insecurity     Worry: Not on file     Inability: Not on file   â¢ Transportation needs     Medical: Not on file     Non-medical: Not on file   Tobacco Use   â¢ Smoking status: Never Smoker   â¢ Smokeless tobacco: Never Used   Substance and Sexual Activity   â¢ Alcohol use: Not on file   â¢ Drug use: Never   â¢ Sexual activity: Not on file   Lifestyle   â¢ Physical activity     Days per week: Not on file     Minutes per session: Not on file   â¢ Stress: Not on file   Relationships   â¢ Social connections     Talks on phone: Not on file     Gets together: Not on file     Attends [employer]ism service: Not on file     Active member of club or organization: Not on file     Attends meetings of clubs or organizations: Not on file     Relationship status: Not on file   â¢ Intimate partner violence     Fear of current or ex partner: Not on file     Emotionally abused: Not on file " Physically abused: Not on file     Forced sexual activity: Not on file   Other Topics Concern   â¢ Not on file   Social History Narrative   â¢ Not on file       ALLERGIES: ALLERGIES:  Isoflavones   (food or med), Tree nuts    (food), Cat dander, Dog dander, Eggs or egg-derived products   (food or med), Grass, Peanut   (food or med), Tomato   (food or med), and Trees     IMMUNIZATIONS:   Immunization History   Administered Date(s) Administered   â¢ DTaP(INFANRIX) 12/10/2012   â¢ DTaP, 5 Pertussis Antigens (DAPTACEL) 11/04/2016   â¢ DTaP/HIB/IPV 2011, 01/02/2012, 03/05/2012   â¢ Hep A, Unspecified formulation 09/14/2012, 03/15/2013   â¢ Hep B, adult 2011, 2011, 03/05/2012   â¢ Hib (PRP-T) 09/14/2012   â¢ Influenza, injectable, quadrivalent 12/11/2014   â¢ Influenza, injectable, quadrivalent, preservative-free 10/12/2017, 12/28/2018, 10/04/2020   â¢ Influenza, seasonal, injectable, preservative free 11/04/2016   â¢ MMR 09/14/2012, 05/16/2017   â¢ Pneumococcal Conjugate 13 valent 2011, 01/02/2012, 03/05/2012, 12/10/2012   â¢ Polio, INACTIVATED 11/04/2016   â¢ Rotavirus - monovalent 03/05/2012   â¢ Rotavirus - pentavalent 2011, 01/02/2012   â¢ Varicella 10/15/2012, 05/16/2017        Visit Vitals  BP (!) 103/55   Pulse 101   Ht 4' 7.35"" (1.406 m)   Wt 32.1 kg (70 lb 12.3 oz)   SpO2 99%   BMI 16.24 kg/mÂ²     Growth percentiles: 82 %ile (Z= 0.92) based on CDC (Boys, 2-20 Years) Stature-for-age data based on Stature recorded on 11/30/2020. 69 %ile (Z= 0.51) based on CDC (Boys, 2-20 Years) weight-for-age data using vitals from 11/30/2020. Body mass index is 16.24 kg/mÂ². Physical Exam  Constitutional:       Appearance: He is well-developed. HENT:      Head: Atraumatic. Mouth/Throat:      Mouth: Mucous membranes are moist.   Eyes:      Conjunctiva/sclera: Conjunctivae normal.   Neck:      Musculoskeletal: Normal range of motion and neck supple.    Cardiovascular:      Rate and Rhythm: Normal rate and " regular rhythm. Heart sounds: S1 normal and S2 normal. No murmur. Pulmonary:      Effort: Pulmonary effort is normal. No accessory muscle usage, respiratory distress, nasal flaring or retractions. Breath sounds: Normal breath sounds and air entry. No stridor, decreased air movement or transmitted upper airway sounds. Chest:      Chest wall: No deformity. Abdominal:      General: Bowel sounds are normal. There is no distension. Palpations: Abdomen is soft. Tenderness: There is no abdominal tenderness. Musculoskeletal: Normal range of motion. Skin:     General: Skin is warm and moist.   Neurological:      Mental Status: He is alert. Motor: No abnormal muscle tone.            ASSESSMENT:  Problem List Items Addressed This Visit        Respiratory    Allergic rhinitis    Moderate persistent asthma without complication - Primary       Other    Allergy to nuts    Allergy to peanuts           1- Flovent 44 mcg 2 puffs twice a day with mask/spacer  2- Albuterol as needed per the asthma action plan  3- Epi pen as needed for allergic reaction   4- Claritin as needed  5- Can use albuterol 2 puffs 10-15 minutes before exercise and again as needed, if symptoms  persist may consider switch from Flovent to Advair  6- Return in March/ April     Makenzie Paulson MD, MSc  Department of Pediatric Pulmonary Medicine  Director, [address] [place] [address] pt aaox4, pt pmhx with of afib, presents to er for palpitations started 9AM this morning after rotating his body, pt states he normally takes cardizem PO around midnight, denies skipping any dose.  pt also c/o indigestion, sob.  +HR noted 140s, 150s upon arrival.

## 2020-12-03 ENCOUNTER — OUTPATIENT (OUTPATIENT)
Dept: OUTPATIENT SERVICES | Facility: HOSPITAL | Age: 55
LOS: 1 days | End: 2020-12-03
Payer: COMMERCIAL

## 2020-12-03 ENCOUNTER — APPOINTMENT (OUTPATIENT)
Dept: CT IMAGING | Facility: CLINIC | Age: 55
End: 2020-12-03
Payer: COMMERCIAL

## 2020-12-03 ENCOUNTER — APPOINTMENT (OUTPATIENT)
Dept: ULTRASOUND IMAGING | Facility: CLINIC | Age: 55
End: 2020-12-03
Payer: COMMERCIAL

## 2020-12-03 ENCOUNTER — RESULT REVIEW (OUTPATIENT)
Age: 55
End: 2020-12-03

## 2020-12-03 DIAGNOSIS — Z00.8 ENCOUNTER FOR OTHER GENERAL EXAMINATION: ICD-10-CM

## 2020-12-03 DIAGNOSIS — Z98.890 OTHER SPECIFIED POSTPROCEDURAL STATES: Chronic | ICD-10-CM

## 2020-12-03 PROCEDURE — 74178 CT ABD&PLV WO CNTR FLWD CNTR: CPT | Mod: 26

## 2020-12-03 PROCEDURE — 93975 VASCULAR STUDY: CPT | Mod: 26

## 2020-12-03 PROCEDURE — 82565 ASSAY OF CREATININE: CPT

## 2020-12-03 PROCEDURE — 74178 CT ABD&PLV WO CNTR FLWD CNTR: CPT

## 2020-12-03 PROCEDURE — 93975 VASCULAR STUDY: CPT

## 2020-12-10 ENCOUNTER — APPOINTMENT (OUTPATIENT)
Dept: UROLOGY | Facility: CLINIC | Age: 55
End: 2020-12-10
Payer: COMMERCIAL

## 2020-12-10 DIAGNOSIS — R36.1 HEMATOSPERMIA: ICD-10-CM

## 2020-12-10 PROCEDURE — 51741 ELECTRO-UROFLOWMETRY FIRST: CPT

## 2020-12-10 PROCEDURE — 76857 US EXAM PELVIC LIMITED: CPT

## 2020-12-10 PROCEDURE — 76872 US TRANSRECTAL: CPT

## 2020-12-10 PROCEDURE — 99072 ADDL SUPL MATRL&STAF TM PHE: CPT

## 2020-12-17 ENCOUNTER — APPOINTMENT (OUTPATIENT)
Dept: UROLOGY | Facility: CLINIC | Age: 55
End: 2020-12-17
Payer: COMMERCIAL

## 2020-12-17 VITALS
RESPIRATION RATE: 16 BRPM | SYSTOLIC BLOOD PRESSURE: 129 MMHG | WEIGHT: 315 LBS | TEMPERATURE: 97.2 F | OXYGEN SATURATION: 94 % | BODY MASS INDEX: 40.43 KG/M2 | DIASTOLIC BLOOD PRESSURE: 89 MMHG | HEIGHT: 74 IN | HEART RATE: 100 BPM

## 2020-12-17 DIAGNOSIS — N41.9 INFLAMMATORY DISEASE OF PROSTATE, UNSPECIFIED: ICD-10-CM

## 2020-12-17 DIAGNOSIS — R31.29 OTHER MICROSCOPIC HEMATURIA: ICD-10-CM

## 2020-12-17 DIAGNOSIS — T78.40XA ALLERGY, UNSPECIFIED, INITIAL ENCOUNTER: ICD-10-CM

## 2020-12-17 PROCEDURE — 99072 ADDL SUPL MATRL&STAF TM PHE: CPT

## 2020-12-17 PROCEDURE — 52000 CYSTOURETHROSCOPY: CPT

## 2020-12-17 RX ORDER — METHYLPREDNISOLONE 4 MG/1
4 TABLET ORAL
Qty: 1 | Refills: 0 | Status: ACTIVE | COMMUNITY
Start: 2020-12-17 | End: 1900-01-01

## 2020-12-17 RX ORDER — FINASTERIDE 5 MG/1
5 TABLET, FILM COATED ORAL DAILY
Qty: 90 | Refills: 3 | Status: ACTIVE | COMMUNITY
Start: 2020-12-17 | End: 1900-01-01

## 2020-12-19 ENCOUNTER — INPATIENT (INPATIENT)
Facility: HOSPITAL | Age: 55
LOS: 1 days | Discharge: HOME | DRG: 310 | End: 2020-12-21
Attending: HOSPITALIST | Admitting: FAMILY MEDICINE
Payer: COMMERCIAL

## 2020-12-19 VITALS — HEART RATE: 107 BPM | WEIGHT: 315 LBS | RESPIRATION RATE: 19 BRPM | HEIGHT: 74 IN | OXYGEN SATURATION: 97 %

## 2020-12-19 DIAGNOSIS — I48.91 UNSPECIFIED ATRIAL FIBRILLATION: ICD-10-CM

## 2020-12-19 DIAGNOSIS — Z98.890 OTHER SPECIFIED POSTPROCEDURAL STATES: Chronic | ICD-10-CM

## 2020-12-19 LAB
ALBUMIN SERPL ELPH-MCNC: 3.9 G/DL — SIGNIFICANT CHANGE UP (ref 3.3–5)
ALP SERPL-CCNC: 56 U/L — SIGNIFICANT CHANGE UP (ref 40–120)
ALT FLD-CCNC: 56 U/L — SIGNIFICANT CHANGE UP (ref 12–78)
ANION GAP SERPL CALC-SCNC: 8 MMOL/L — SIGNIFICANT CHANGE UP (ref 5–17)
APPEARANCE UR: CLEAR — SIGNIFICANT CHANGE UP
APTT BLD: 30.6 SEC — SIGNIFICANT CHANGE UP (ref 27.5–35.5)
APTT BLD: 96.8 SEC — HIGH (ref 27.5–35.5)
AST SERPL-CCNC: 30 U/L — SIGNIFICANT CHANGE UP (ref 15–37)
BASOPHILS # BLD AUTO: 0.03 K/UL — SIGNIFICANT CHANGE UP (ref 0–0.2)
BASOPHILS NFR BLD AUTO: 0.2 % — SIGNIFICANT CHANGE UP (ref 0–2)
BILIRUB SERPL-MCNC: 0.5 MG/DL — SIGNIFICANT CHANGE UP (ref 0.2–1.2)
BILIRUB UR-MCNC: NEGATIVE — SIGNIFICANT CHANGE UP
BUN SERPL-MCNC: 14 MG/DL — SIGNIFICANT CHANGE UP (ref 7–23)
CALCIUM SERPL-MCNC: 9.2 MG/DL — SIGNIFICANT CHANGE UP (ref 8.5–10.1)
CHLORIDE SERPL-SCNC: 106 MMOL/L — SIGNIFICANT CHANGE UP (ref 96–108)
CO2 SERPL-SCNC: 25 MMOL/L — SIGNIFICANT CHANGE UP (ref 22–31)
COLOR SPEC: YELLOW — SIGNIFICANT CHANGE UP
CREAT SERPL-MCNC: 1.16 MG/DL — SIGNIFICANT CHANGE UP (ref 0.5–1.3)
DIFF PNL FLD: NEGATIVE — SIGNIFICANT CHANGE UP
EOSINOPHIL # BLD AUTO: 0 K/UL — SIGNIFICANT CHANGE UP (ref 0–0.5)
EOSINOPHIL NFR BLD AUTO: 0 % — SIGNIFICANT CHANGE UP (ref 0–6)
GLUCOSE SERPL-MCNC: 171 MG/DL — HIGH (ref 70–99)
GLUCOSE UR QL: NEGATIVE MG/DL — SIGNIFICANT CHANGE UP
HCT VFR BLD CALC: 46.5 % — SIGNIFICANT CHANGE UP (ref 39–50)
HCT VFR BLD CALC: 46.7 % — SIGNIFICANT CHANGE UP (ref 39–50)
HGB BLD-MCNC: 15.9 G/DL — SIGNIFICANT CHANGE UP (ref 13–17)
HGB BLD-MCNC: 15.9 G/DL — SIGNIFICANT CHANGE UP (ref 13–17)
IMM GRANULOCYTES NFR BLD AUTO: 0.6 % — SIGNIFICANT CHANGE UP (ref 0–1.5)
INR BLD: 1.04 RATIO — SIGNIFICANT CHANGE UP (ref 0.88–1.16)
KETONES UR-MCNC: NEGATIVE — SIGNIFICANT CHANGE UP
LEUKOCYTE ESTERASE UR-ACNC: NEGATIVE — SIGNIFICANT CHANGE UP
LYMPHOCYTES # BLD AUTO: 1.65 K/UL — SIGNIFICANT CHANGE UP (ref 1–3.3)
LYMPHOCYTES # BLD AUTO: 10.3 % — LOW (ref 13–44)
MAGNESIUM SERPL-MCNC: 2.2 MG/DL — SIGNIFICANT CHANGE UP (ref 1.6–2.6)
MCHC RBC-ENTMCNC: 32.9 PG — SIGNIFICANT CHANGE UP (ref 27–34)
MCHC RBC-ENTMCNC: 33.3 PG — SIGNIFICANT CHANGE UP (ref 27–34)
MCHC RBC-ENTMCNC: 34 GM/DL — SIGNIFICANT CHANGE UP (ref 32–36)
MCHC RBC-ENTMCNC: 34.2 GM/DL — SIGNIFICANT CHANGE UP (ref 32–36)
MCV RBC AUTO: 96.1 FL — SIGNIFICANT CHANGE UP (ref 80–100)
MCV RBC AUTO: 97.9 FL — SIGNIFICANT CHANGE UP (ref 80–100)
MONOCYTES # BLD AUTO: 0.66 K/UL — SIGNIFICANT CHANGE UP (ref 0–0.9)
MONOCYTES NFR BLD AUTO: 4.1 % — SIGNIFICANT CHANGE UP (ref 2–14)
NEUTROPHILS # BLD AUTO: 13.58 K/UL — HIGH (ref 1.8–7.4)
NEUTROPHILS NFR BLD AUTO: 84.8 % — HIGH (ref 43–77)
NITRITE UR-MCNC: NEGATIVE — SIGNIFICANT CHANGE UP
NT-PROBNP SERPL-SCNC: 28 PG/ML — SIGNIFICANT CHANGE UP (ref 0–125)
PH UR: 8 — SIGNIFICANT CHANGE UP (ref 5–8)
PLATELET # BLD AUTO: 253 K/UL — SIGNIFICANT CHANGE UP (ref 150–400)
PLATELET # BLD AUTO: 255 K/UL — SIGNIFICANT CHANGE UP (ref 150–400)
POTASSIUM SERPL-MCNC: 3.6 MMOL/L — SIGNIFICANT CHANGE UP (ref 3.5–5.3)
POTASSIUM SERPL-SCNC: 3.6 MMOL/L — SIGNIFICANT CHANGE UP (ref 3.5–5.3)
PROT SERPL-MCNC: 7.7 GM/DL — SIGNIFICANT CHANGE UP (ref 6–8.3)
PROT UR-MCNC: NEGATIVE MG/DL — SIGNIFICANT CHANGE UP
PROTHROM AB SERPL-ACNC: 12 SEC — SIGNIFICANT CHANGE UP (ref 10.6–13.6)
RBC # BLD: 4.77 M/UL — SIGNIFICANT CHANGE UP (ref 4.2–5.8)
RBC # BLD: 4.84 M/UL — SIGNIFICANT CHANGE UP (ref 4.2–5.8)
RBC # FLD: 14.6 % — HIGH (ref 10.3–14.5)
RBC # FLD: 14.8 % — HIGH (ref 10.3–14.5)
SARS-COV-2 RNA SPEC QL NAA+PROBE: SIGNIFICANT CHANGE UP
SODIUM SERPL-SCNC: 139 MMOL/L — SIGNIFICANT CHANGE UP (ref 135–145)
SP GR SPEC: 1.01 — SIGNIFICANT CHANGE UP (ref 1.01–1.02)
TROPONIN I SERPL-MCNC: 0.05 NG/ML — HIGH (ref 0.01–0.04)
UROBILINOGEN FLD QL: NEGATIVE MG/DL — SIGNIFICANT CHANGE UP
WBC # BLD: 15.49 K/UL — HIGH (ref 3.8–10.5)
WBC # BLD: 16.01 K/UL — HIGH (ref 3.8–10.5)
WBC # FLD AUTO: 15.49 K/UL — HIGH (ref 3.8–10.5)
WBC # FLD AUTO: 16.01 K/UL — HIGH (ref 3.8–10.5)

## 2020-12-19 PROCEDURE — 99223 1ST HOSP IP/OBS HIGH 75: CPT

## 2020-12-19 PROCEDURE — 85027 COMPLETE CBC AUTOMATED: CPT

## 2020-12-19 PROCEDURE — 71045 X-RAY EXAM CHEST 1 VIEW: CPT | Mod: 26

## 2020-12-19 PROCEDURE — 80048 BASIC METABOLIC PNL TOTAL CA: CPT

## 2020-12-19 PROCEDURE — 87086 URINE CULTURE/COLONY COUNT: CPT

## 2020-12-19 PROCEDURE — 93010 ELECTROCARDIOGRAM REPORT: CPT

## 2020-12-19 PROCEDURE — 85730 THROMBOPLASTIN TIME PARTIAL: CPT

## 2020-12-19 PROCEDURE — 85025 COMPLETE CBC W/AUTO DIFF WBC: CPT

## 2020-12-19 PROCEDURE — 93005 ELECTROCARDIOGRAM TRACING: CPT

## 2020-12-19 PROCEDURE — 81003 URINALYSIS AUTO W/O SCOPE: CPT

## 2020-12-19 PROCEDURE — 36415 COLL VENOUS BLD VENIPUNCTURE: CPT

## 2020-12-19 RX ORDER — HEPARIN SODIUM 5000 [USP'U]/ML
10000 INJECTION INTRAVENOUS; SUBCUTANEOUS EVERY 6 HOURS
Refills: 0 | Status: DISCONTINUED | OUTPATIENT
Start: 2020-12-19 | End: 2020-12-19

## 2020-12-19 RX ORDER — HEPARIN SODIUM 5000 [USP'U]/ML
5000 INJECTION INTRAVENOUS; SUBCUTANEOUS EVERY 6 HOURS
Refills: 0 | Status: DISCONTINUED | OUTPATIENT
Start: 2020-12-19 | End: 2020-12-20

## 2020-12-19 RX ORDER — DILTIAZEM HCL 120 MG
10 CAPSULE, EXT RELEASE 24 HR ORAL ONCE
Refills: 0 | Status: COMPLETED | OUTPATIENT
Start: 2020-12-19 | End: 2020-12-19

## 2020-12-19 RX ORDER — PANTOPRAZOLE SODIUM 20 MG/1
40 TABLET, DELAYED RELEASE ORAL
Refills: 0 | Status: DISCONTINUED | OUTPATIENT
Start: 2020-12-19 | End: 2020-12-21

## 2020-12-19 RX ORDER — SENNA PLUS 8.6 MG/1
2 TABLET ORAL AT BEDTIME
Refills: 0 | Status: DISCONTINUED | OUTPATIENT
Start: 2020-12-19 | End: 2020-12-21

## 2020-12-19 RX ORDER — HEPARIN SODIUM 5000 [USP'U]/ML
INJECTION INTRAVENOUS; SUBCUTANEOUS
Qty: 25000 | Refills: 0 | Status: DISCONTINUED | OUTPATIENT
Start: 2020-12-19 | End: 2020-12-19

## 2020-12-19 RX ORDER — ATORVASTATIN CALCIUM 80 MG/1
10 TABLET, FILM COATED ORAL AT BEDTIME
Refills: 0 | Status: DISCONTINUED | OUTPATIENT
Start: 2020-12-19 | End: 2020-12-21

## 2020-12-19 RX ORDER — ASPIRIN/CALCIUM CARB/MAGNESIUM 324 MG
81 TABLET ORAL DAILY
Refills: 0 | Status: DISCONTINUED | OUTPATIENT
Start: 2020-12-19 | End: 2020-12-21

## 2020-12-19 RX ORDER — DILTIAZEM HCL 120 MG
5 CAPSULE, EXT RELEASE 24 HR ORAL
Qty: 125 | Refills: 0 | Status: DISCONTINUED | OUTPATIENT
Start: 2020-12-19 | End: 2020-12-21

## 2020-12-19 RX ORDER — SODIUM CHLORIDE 9 MG/ML
1000 INJECTION INTRAMUSCULAR; INTRAVENOUS; SUBCUTANEOUS ONCE
Refills: 0 | Status: COMPLETED | OUTPATIENT
Start: 2020-12-19 | End: 2020-12-19

## 2020-12-19 RX ORDER — HEPARIN SODIUM 5000 [USP'U]/ML
10000 INJECTION INTRAVENOUS; SUBCUTANEOUS ONCE
Refills: 0 | Status: DISCONTINUED | OUTPATIENT
Start: 2020-12-19 | End: 2020-12-19

## 2020-12-19 RX ORDER — HEPARIN SODIUM 5000 [USP'U]/ML
INJECTION INTRAVENOUS; SUBCUTANEOUS
Qty: 25000 | Refills: 0 | Status: DISCONTINUED | OUTPATIENT
Start: 2020-12-19 | End: 2020-12-20

## 2020-12-19 RX ADMIN — ATORVASTATIN CALCIUM 10 MILLIGRAM(S): 80 TABLET, FILM COATED ORAL at 22:24

## 2020-12-19 RX ADMIN — HEPARIN SODIUM 2400 UNIT(S)/HR: 5000 INJECTION INTRAVENOUS; SUBCUTANEOUS at 16:49

## 2020-12-19 RX ADMIN — Medication 4 MILLIGRAM(S): at 23:55

## 2020-12-19 RX ADMIN — Medication 5 MG/HR: at 15:05

## 2020-12-19 RX ADMIN — Medication 10 MILLIGRAM(S): at 12:39

## 2020-12-19 RX ADMIN — SENNA PLUS 2 TABLET(S): 8.6 TABLET ORAL at 22:24

## 2020-12-19 RX ADMIN — Medication 10 MILLIGRAM(S): at 13:47

## 2020-12-19 RX ADMIN — SODIUM CHLORIDE 1000 MILLILITER(S): 9 INJECTION INTRAMUSCULAR; INTRAVENOUS; SUBCUTANEOUS at 12:39

## 2020-12-19 RX ADMIN — SODIUM CHLORIDE 1000 MILLILITER(S): 9 INJECTION INTRAMUSCULAR; INTRAVENOUS; SUBCUTANEOUS at 13:36

## 2020-12-19 NOTE — ED ADULT NURSE REASSESSMENT NOTE - NS ED NURSE REASSESS COMMENT FT1
pt resting comfortably, vitals stable, heparin and cardizem infusing. pt aware of plan of care, awaiting covid for placement
handoff report taken from day PJ Austin. pt. noted resting comfortably in stretcher. No distress noted, denies pain, CArdizem drip at 5mg/hr heparin gtt at 2400 unit/hr. safety maintained. TM

## 2020-12-19 NOTE — ED PROVIDER NOTE - OBJECTIVE STATEMENT
56 y/o M with a PMHx of HTN, Afib, Chiari I malformation presenting to the ED c/o palpitations starting x2 hours PTA. Patient reports he was recently started on Cipro prophylactically due to upcoming procedure to check his prostate, bud had reaction to medication. Is currently on day 3 of PO steroid, came to the ED today after he started having symptoms. Took two 81mg of ASA PTA, took his normal dose of Cardizem this AM. Denies any SOB, CP, N/V/D, dizziness, fever or chills.

## 2020-12-19 NOTE — ED ADULT NURSE NOTE - OBJECTIVE STATEMENT
pt presents to ed ambulatory for evaluation of palpitations x 2 hours PTA. pt pt presents to ed ambulatory for evaluation of palpitations x 2 hours PTA. pt takes cardizem for afib, took todays dose. pt on prednisone for reaction to cipro. pt hr 120s-140s afib. pt bp normotensive. pt in no distress. ekg done, a&ox4

## 2020-12-19 NOTE — H&P ADULT - HISTORY OF PRESENT ILLNESS
HPI: The patient is a 56 yo male with Hx. of paroxysmal  Atrial fib. which  converted to NSR in 20 min. in the past, had prior hospitalizations in 2017, 2019, KT score 1 treated with ASA 81,  who presented in the ED for palpitations  started 2 hours PTA. He had recent cystoscopy with  and treated with Cipro , developed allergic reaction to Cipro, and was treated with prednisone.       PMHx: as above, Atrial fib. HTN, Kidney stones, varicocele  Chiari malformation CS    PSHx: Cervical spine surgery for Chiari malformatin in     Family Hx: Mother  at eddie 60 from Leiomyosarcoma, , Father had had Prostate CA and CABGx 3.    Social Hx.: not smoking, no alcohol use    ROS:   Eyes: no changes in vision    ENT/Mouth: no changes    Cardiovascular: no chest pain, had palpitations.    Respiratory: no SOB    Gastrointestinal: no diarrhea, no nausea, no vomiting    Genitourinary: no dysuria    Breast: no pain    Musculoskeletal: no pain    Integumentary: no itching    Neurological: No Headache, no tremor,    Psychiatric: no suicidal ideations    Endocrine: no excessive thirst,     Hematologic/Lymphatic: no swollen glands    Allergic/Immunologic: no itching      Physical Exam: Vital Signs Last 24 Hrs  T(C): 36.8 (19 Dec 2020 19:48), Max: 37 (19 Dec 2020 12:13)  T(F): 98.2 (19 Dec 2020 19:48), Max: 98.6 (19 Dec 2020 12:13)  HR: 81 (19 Dec 2020 19:48) (52 - 125)  BP: 120/84 (19 Dec 2020 19:48) (103/78 - 150/103)  BP(mean): 91 (19 Dec 2020 14:59) (91 - 117)  RR: 18 (19 Dec 2020 19:48) (16 - 19)  SpO2: 100% (19 Dec 2020 19:48) (95% - 100%)        HEENT: PRRL EOMI    MOUTH/TEETH/GUMS: Clear    NECK: no JVD    LUNGS: Clear    HEART: S1,S2 RR    ABDOMEN: soft nontender    EXTREMITIES:  no pedal edema    MUSCULOSKELETAL: no joint swelling     NEURO: no tremor, no focal signs.    SKIN: no rash    : CVA negative,     Lab:                       15.9   16.01 )-----------( 253      ( 19 Dec 2020 12:38 )             46.5           139  |  106  |  14  ----------------------------<  171<H>  3.6   |  25  |  1.16    Ca    9.2      19 Dec 2020 12:38  Mg     2.2         TPro  7.7  /  Alb  3.9  /  TBili  0.5  /  DBili  x   /  AST  30  /  ALT  56  /  AlkPhos  56      EKG atrial fib. with RVR

## 2020-12-19 NOTE — H&P ADULT - ASSESSMENT
The patient is a 54 yo male with Hx. of paroxysmal  Atrial fib. which  converted to NSR in 20 min. in the past, had prior hospitalizations in 2017, 9/2019, KT score 1 treated with ASA 81,  who presented in the ED for palpitations  started 2 hours PTA. He had recent cystoscopy with  and treated with Cipro , developed allergic reaction to Cipro, and was treated with prednisone.     assessment Dx:                       1. Atrial fib with RVR                       2. HTN                       3. BPH                       4. Allergic reaction to Cipro.     Plan:    admit to Telemetry               medications: as per med rec.                VTEP: startd on IV Heparin               Labs: cbc,cmp               Radiology:                Cardiac diagnostics: EKG               Consults: Cardiology

## 2020-12-19 NOTE — ED ADULT NURSE REASSESSMENT NOTE - NSIMPLEMENTINTERV_GEN_ALL_ED
Implemented All Fall with Harm Risk Interventions:  [place] to call system. Call bell, personal items and telephone within reach. Instruct patient to call for assistance. Room bathroom lighting operational. Non-slip footwear when patient is off stretcher. Physically safe environment: no spills, clutter or unnecessary equipment. Stretcher in lowest position, wheels locked, appropriate side rails in place. Provide visual cue, wrist band, yellow gown, etc. Monitor gait and stability. Monitor for mental status changes and reorient to person, place, and time. Review medications for side effects contributing to fall risk. Reinforce activity limits and safety measures with patient and family. Provide visual clues: red socks.

## 2020-12-19 NOTE — ED ADULT TRIAGE NOTE - CHIEF COMPLAINT QUOTE
Patient comes to ED for palpitations. Patient reports at 1030 noticing, hx of Afib. patient was on cipro this week for "a test", pt had a reaction and given steroids. Patient reports some SOB. denies chest pain

## 2020-12-20 DIAGNOSIS — I10 ESSENTIAL (PRIMARY) HYPERTENSION: ICD-10-CM

## 2020-12-20 DIAGNOSIS — I48.0 PAROXYSMAL ATRIAL FIBRILLATION: ICD-10-CM

## 2020-12-20 LAB
ANION GAP SERPL CALC-SCNC: 7 MMOL/L — SIGNIFICANT CHANGE UP (ref 5–17)
APTT BLD: 115.2 SEC — HIGH (ref 27.5–35.5)
BASOPHILS # BLD AUTO: 0.07 K/UL — SIGNIFICANT CHANGE UP (ref 0–0.2)
BASOPHILS NFR BLD AUTO: 0.5 % — SIGNIFICANT CHANGE UP (ref 0–2)
BUN SERPL-MCNC: 16 MG/DL — SIGNIFICANT CHANGE UP (ref 7–23)
CALCIUM SERPL-MCNC: 9.2 MG/DL — SIGNIFICANT CHANGE UP (ref 8.5–10.1)
CHLORIDE SERPL-SCNC: 105 MMOL/L — SIGNIFICANT CHANGE UP (ref 96–108)
CO2 SERPL-SCNC: 30 MMOL/L — SIGNIFICANT CHANGE UP (ref 22–31)
CREAT SERPL-MCNC: 1.13 MG/DL — SIGNIFICANT CHANGE UP (ref 0.5–1.3)
CULTURE RESULTS: NO GROWTH — SIGNIFICANT CHANGE UP
EOSINOPHIL # BLD AUTO: 0.02 K/UL — SIGNIFICANT CHANGE UP (ref 0–0.5)
EOSINOPHIL NFR BLD AUTO: 0.1 % — SIGNIFICANT CHANGE UP (ref 0–6)
GLUCOSE SERPL-MCNC: 127 MG/DL — HIGH (ref 70–99)
HCT VFR BLD CALC: 46.3 % — SIGNIFICANT CHANGE UP (ref 39–50)
HGB BLD-MCNC: 15.4 G/DL — SIGNIFICANT CHANGE UP (ref 13–17)
IMM GRANULOCYTES NFR BLD AUTO: 0.4 % — SIGNIFICANT CHANGE UP (ref 0–1.5)
LYMPHOCYTES # BLD AUTO: 16.2 % — SIGNIFICANT CHANGE UP (ref 13–44)
LYMPHOCYTES # BLD AUTO: 2.35 K/UL — SIGNIFICANT CHANGE UP (ref 1–3.3)
MCHC RBC-ENTMCNC: 32.7 PG — SIGNIFICANT CHANGE UP (ref 27–34)
MCHC RBC-ENTMCNC: 33.3 GM/DL — SIGNIFICANT CHANGE UP (ref 32–36)
MCV RBC AUTO: 98.3 FL — SIGNIFICANT CHANGE UP (ref 80–100)
MONOCYTES # BLD AUTO: 1.12 K/UL — HIGH (ref 0–0.9)
MONOCYTES NFR BLD AUTO: 7.7 % — SIGNIFICANT CHANGE UP (ref 2–14)
NEUTROPHILS # BLD AUTO: 10.87 K/UL — HIGH (ref 1.8–7.4)
NEUTROPHILS NFR BLD AUTO: 75.1 % — SIGNIFICANT CHANGE UP (ref 43–77)
PLATELET # BLD AUTO: 265 K/UL — SIGNIFICANT CHANGE UP (ref 150–400)
POTASSIUM SERPL-MCNC: 3.9 MMOL/L — SIGNIFICANT CHANGE UP (ref 3.5–5.3)
POTASSIUM SERPL-SCNC: 3.9 MMOL/L — SIGNIFICANT CHANGE UP (ref 3.5–5.3)
RBC # BLD: 4.71 M/UL — SIGNIFICANT CHANGE UP (ref 4.2–5.8)
RBC # FLD: 14.9 % — HIGH (ref 10.3–14.5)
SARS-COV-2 IGG SERPL QL IA: NEGATIVE — SIGNIFICANT CHANGE UP
SARS-COV-2 IGM SERPL IA-ACNC: <0.1 INDEX — SIGNIFICANT CHANGE UP
SODIUM SERPL-SCNC: 142 MMOL/L — SIGNIFICANT CHANGE UP (ref 135–145)
SPECIMEN SOURCE: SIGNIFICANT CHANGE UP
WBC # BLD: 14.49 K/UL — HIGH (ref 3.8–10.5)
WBC # FLD AUTO: 14.49 K/UL — HIGH (ref 3.8–10.5)

## 2020-12-20 PROCEDURE — 93010 ELECTROCARDIOGRAM REPORT: CPT

## 2020-12-20 PROCEDURE — 99233 SBSQ HOSP IP/OBS HIGH 50: CPT

## 2020-12-20 PROCEDURE — 99223 1ST HOSP IP/OBS HIGH 75: CPT

## 2020-12-20 RX ORDER — ENOXAPARIN SODIUM 100 MG/ML
140 INJECTION SUBCUTANEOUS
Refills: 0 | Status: DISCONTINUED | OUTPATIENT
Start: 2020-12-20 | End: 2020-12-21

## 2020-12-20 RX ORDER — DILTIAZEM HCL 120 MG
60 CAPSULE, EXT RELEASE 24 HR ORAL THREE TIMES A DAY
Refills: 0 | Status: DISCONTINUED | OUTPATIENT
Start: 2020-12-20 | End: 2020-12-21

## 2020-12-20 RX ADMIN — Medication 4 MILLIGRAM(S): at 06:39

## 2020-12-20 RX ADMIN — HEPARIN SODIUM 2100 UNIT(S)/HR: 5000 INJECTION INTRAVENOUS; SUBCUTANEOUS at 09:00

## 2020-12-20 RX ADMIN — Medication 81 MILLIGRAM(S): at 09:47

## 2020-12-20 RX ADMIN — ATORVASTATIN CALCIUM 10 MILLIGRAM(S): 80 TABLET, FILM COATED ORAL at 23:05

## 2020-12-20 RX ADMIN — Medication 5 MG/HR: at 23:05

## 2020-12-20 RX ADMIN — PANTOPRAZOLE SODIUM 40 MILLIGRAM(S): 20 TABLET, DELAYED RELEASE ORAL at 09:47

## 2020-12-20 RX ADMIN — ENOXAPARIN SODIUM 140 MILLIGRAM(S): 100 INJECTION SUBCUTANEOUS at 23:04

## 2020-12-20 RX ADMIN — ENOXAPARIN SODIUM 140 MILLIGRAM(S): 100 INJECTION SUBCUTANEOUS at 12:35

## 2020-12-20 RX ADMIN — HEPARIN SODIUM 2400 UNIT(S)/HR: 5000 INJECTION INTRAVENOUS; SUBCUTANEOUS at 00:01

## 2020-12-20 RX ADMIN — SENNA PLUS 2 TABLET(S): 8.6 TABLET ORAL at 23:04

## 2020-12-20 RX ADMIN — Medication 60 MILLIGRAM(S): at 23:04

## 2020-12-20 RX ADMIN — Medication 60 MILLIGRAM(S): at 15:13

## 2020-12-20 NOTE — PROGRESS NOTE ADULT - SUBJECTIVE AND OBJECTIVE BOX
Cheif complaints and Diagnosis: afib w/ rvr    Subjective:       REVIEW OF SYSTEMS:    CONSTITUTIONAL: No weakness, fevers or chills  EYES/ENT: No visual changes;  No vertigo or throat pain   NECK: No pain or stiffness  RESPIRATORY: No cough, wheezing, hemoptysis; No shortness of breath  CARDIOVASCULAR: No chest pain or palpitations  GASTROINTESTINAL: No abdominal or epigastric pain. No nausea, vomiting, or hematemesis; No diarrhea or constipation. No melena or hematochezia.  GENITOURINARY: No dysuria, frequency or hematuria  NEUROLOGICAL: No numbness or weakness  SKIN: No itching, burning, rashes, or lesions   All other review of systems is negative unless indicated above      Vital Signs Last 24 Hrs  T(C): 36.7 (19 Dec 2020 21:12), Max: 37 (19 Dec 2020 12:13)  T(F): 98 (19 Dec 2020 21:12), Max: 98.6 (19 Dec 2020 12:13)  HR: 90 (20 Dec 2020 06:40) (52 - 125)  BP: 125/89 (20 Dec 2020 06:40) (101/58 - 150/103)  BP(mean): 91 (19 Dec 2020 14:59) (91 - 117)  RR: 16 (19 Dec 2020 21:12) (16 - 19)  SpO2: 99% (19 Dec 2020 21:12) (95% - 100%)    HEENT:   pupils equal and reactive, EOMI, no oropharyngeal lesions, erythema, exudates, oral thrush    NECK:   supple, no carotid bruits, no palpable lymph nodes, no thyromegaly    CV:  +S1, +S2, regular, no murmurs or rubs    RESP:   lungs clear to auscultation bilaterally, no wheezing, rales, rhonchi, good air entry bilaterally    BREAST:  not examined    GI:  abdomen soft, non-tender, non-distended, normal BS, no bruits, no abdominal masses, no palpable masses    RECTAL:  not examined    :  not examined    MSK:   normal muscle tone, no atrophy, no rigidity, no contractions    EXT:   no clubbing, no cyanosis, no edema, no calf pain, swelling or erythema    VASCULAR:  pulses equal and symmetric in the upper and lower extremities    NEURO:  AAOX3, no focal neurological deficits, follows all commands, able to move extremities spontaneously    SKIN:  no ulcers, lesions or rashes    MEDICATIONS  (STANDING):  aspirin  chewable 81 milliGRAM(s) Oral daily  atorvastatin 10 milliGRAM(s) Oral at bedtime  diltiazem Infusion 5 mG/Hr (5 mL/Hr) IV Continuous <Continuous>  heparin  Infusion.  Unit(s)/Hr (24 mL/Hr) IV Continuous <Continuous>  methylPREDNISolone 4 milliGRAM(s) Oral before breakfast  pantoprazole    Tablet 40 milliGRAM(s) Oral before breakfast  senna 2 Tablet(s) Oral at bedtime    MEDICATIONS  (PRN):  heparin   Injectable 5000 Unit(s) IV Push every 6 hours PRN For aPTT between 40 - 57  heparin   Injectable 5000 Unit(s) IV Push every 6 hours PRN For aPTT between 40 - 57      Urinalysis Basic - ( 19 Dec 2020 17:30 )    Color: Yellow / Appearance: Clear / S.010 / pH: x  Gluc: x / Ketone: Negative  / Bili: Negative / Urobili: Negative mg/dL   Blood: x / Protein: Negative mg/dL / Nitrite: Negative   Leuk Esterase: Negative / RBC: x / WBC x   Sq Epi: x / Non Sq Epi: x / Bacteria: x    20 Dec 2020 06:11    142    |  105    |  16     ----------------------------<  127    3.9     |  30     |  1.13     Ca    9.2        20 Dec 2020 06:11  Mg     2.2       19 Dec 2020 12:38    TPro  7.7    /  Alb  3.9    /  TBili  0.5    /  DBili  x      /  AST  30     /  ALT  56     /  AlkPhos  56     19 Dec 2020 12:38  LIVER FUNCTIONS - ( 19 Dec 2020 12:38 )  Alb: 3.9 g/dL / Pro: 7.7 gm/dL / ALK PHOS: 56 U/L / ALT: 56 U/L / AST: 30 U/L / GGT: x         PT/INR - ( 19 Dec 2020 12:38 )   PT: 12.0 sec;   INR: 1.04 ratio         PTT - ( 20 Dec 2020 06:11 )  PTT:115.2 secCBC Full  -  ( 20 Dec 2020 06:11 )  WBC Count : 14.49 K/uL  Hemoglobin : 15.4 g/dL  Hematocrit : 46.3 %  Platelet Count - Automated : 265 K/uL  Mean Cell Volume : 98.3 fl  Mean Cell Hemoglobin : 32.7 pg  Mean Cell Hemoglobin Concentration : 33.3 gm/dL  Auto Neutrophil # : 10.87 K/uL  Auto Lymphocyte # : 2.35 K/uL  Auto Monocyte # : 1.12 K/uL  Auto Eosinophil # : 0.02 K/uL  Auto Basophil # : 0.07 K/uL  Auto Neutrophil % : 75.1 %  Auto Lymphocyte % : 16.2 %  Auto Monocyte % : 7.7 %  Auto Eosinophil % : 0.1 %  Auto Basophil % : 0.5 %  CARDIAC MARKERS ( 19 Dec 2020 14:43 )  0.051 ng/mL / x     / x     / x     / x      CARDIAC MARKERS ( 19 Dec 2020 12:38 )  <0.015 ng/mL / x     / x     / x     / x            Blood, Urine: Negative ( @ 17:30)      PT/INR - ( 19 Dec 2020 12:38 )   PT: 12.0 sec;   INR: 1.04 ratio         PTT - ( 20 Dec 2020 06:11 )  PTT:115.2 sec    RADIOLOGY RESULTS:          Assessment and Plan:      	  The patient is a 54 yo male with Hx. of paroxysmal  Atrial fib. which  converted to NSR in 20 min. in the past, had prior hospitalizations in 2017, 2019, KT score 1 treated with ASA 81,  who presented in the ED for palpitations  started 2 hours PTA. He had recent cystoscopy with  and treated with Cipro , developed allergic reaction to Cipro, and was treated with prednisone.     assessment Dx:                       1. Atrial fib with RVR >>c/w cardizem drip; possible DCCV  later on; c/w Full dose lovenox                       2. HTN- well controlled                       3. BPH-c/w meds                       4-dvt prphy- on full dose Lovenox       Cheif complaints and Diagnosis: afib w/ rvr    Subjective:       REVIEW OF SYSTEMS:    CONSTITUTIONAL: No weakness, fevers or chills  EYES/ENT: No visual changes;  No vertigo or throat pain   NECK: No pain or stiffness  RESPIRATORY: No cough, wheezing, hemoptysis; No shortness of breath  CARDIOVASCULAR: No chest pain or palpitations  GASTROINTESTINAL: No abdominal or epigastric pain. No nausea, vomiting, or hematemesis; No diarrhea or constipation. No melena or hematochezia.  GENITOURINARY: No dysuria, frequency or hematuria  NEUROLOGICAL: No numbness or weakness  SKIN: No itching, burning, rashes, or lesions   All other review of systems is negative unless indicated above      Vital Signs Last 24 Hrs  T(C): 36.7 (19 Dec 2020 21:12), Max: 37 (19 Dec 2020 12:13)  T(F): 98 (19 Dec 2020 21:12), Max: 98.6 (19 Dec 2020 12:13)  HR: 90 (20 Dec 2020 06:40) (52 - 125)  BP: 125/89 (20 Dec 2020 06:40) (101/58 - 150/103)  BP(mean): 91 (19 Dec 2020 14:59) (91 - 117)  RR: 16 (19 Dec 2020 21:12) (16 - 19)  SpO2: 99% (19 Dec 2020 21:12) (95% - 100%)    HEENT:   pupils equal and reactive, EOMI, no oropharyngeal lesions, erythema, exudates, oral thrush    NECK:   supple, no carotid bruits, no palpable lymph nodes, no thyromegaly    CV:  +S1, +S2, regular, no murmurs or rubs    RESP:   lungs clear to auscultation bilaterally, no wheezing, rales, rhonchi, good air entry bilaterally    BREAST:  not examined    GI:  abdomen soft, non-tender, non-distended, normal BS, no bruits, no abdominal masses, no palpable masses    RECTAL:  not examined    :  not examined    MSK:   normal muscle tone, no atrophy, no rigidity, no contractions    EXT:   no clubbing, no cyanosis, no edema, no calf pain, swelling or erythema    VASCULAR:  pulses equal and symmetric in the upper and lower extremities    NEURO:  AAOX3, no focal neurological deficits, follows all commands, able to move extremities spontaneously    SKIN:  no ulcers, lesions or rashes    MEDICATIONS  (STANDING):  aspirin  chewable 81 milliGRAM(s) Oral daily  atorvastatin 10 milliGRAM(s) Oral at bedtime  diltiazem Infusion 5 mG/Hr (5 mL/Hr) IV Continuous <Continuous>  heparin  Infusion.  Unit(s)/Hr (24 mL/Hr) IV Continuous <Continuous>  methylPREDNISolone 4 milliGRAM(s) Oral before breakfast  pantoprazole    Tablet 40 milliGRAM(s) Oral before breakfast  senna 2 Tablet(s) Oral at bedtime    MEDICATIONS  (PRN):  heparin   Injectable 5000 Unit(s) IV Push every 6 hours PRN For aPTT between 40 - 57  heparin   Injectable 5000 Unit(s) IV Push every 6 hours PRN For aPTT between 40 - 57      Urinalysis Basic - ( 19 Dec 2020 17:30 )    Color: Yellow / Appearance: Clear / S.010 / pH: x  Gluc: x / Ketone: Negative  / Bili: Negative / Urobili: Negative mg/dL   Blood: x / Protein: Negative mg/dL / Nitrite: Negative   Leuk Esterase: Negative / RBC: x / WBC x   Sq Epi: x / Non Sq Epi: x / Bacteria: x    20 Dec 2020 06:11    142    |  105    |  16     ----------------------------<  127    3.9     |  30     |  1.13     Ca    9.2        20 Dec 2020 06:11  Mg     2.2       19 Dec 2020 12:38    TPro  7.7    /  Alb  3.9    /  TBili  0.5    /  DBili  x      /  AST  30     /  ALT  56     /  AlkPhos  56     19 Dec 2020 12:38  LIVER FUNCTIONS - ( 19 Dec 2020 12:38 )  Alb: 3.9 g/dL / Pro: 7.7 gm/dL / ALK PHOS: 56 U/L / ALT: 56 U/L / AST: 30 U/L / GGT: x         PT/INR - ( 19 Dec 2020 12:38 )   PT: 12.0 sec;   INR: 1.04 ratio         PTT - ( 20 Dec 2020 06:11 )  PTT:115.2 secCBC Full  -  ( 20 Dec 2020 06:11 )  WBC Count : 14.49 K/uL  Hemoglobin : 15.4 g/dL  Hematocrit : 46.3 %  Platelet Count - Automated : 265 K/uL  Mean Cell Volume : 98.3 fl  Mean Cell Hemoglobin : 32.7 pg  Mean Cell Hemoglobin Concentration : 33.3 gm/dL  Auto Neutrophil # : 10.87 K/uL  Auto Lymphocyte # : 2.35 K/uL  Auto Monocyte # : 1.12 K/uL  Auto Eosinophil # : 0.02 K/uL  Auto Basophil # : 0.07 K/uL  Auto Neutrophil % : 75.1 %  Auto Lymphocyte % : 16.2 %  Auto Monocyte % : 7.7 %  Auto Eosinophil % : 0.1 %  Auto Basophil % : 0.5 %  CARDIAC MARKERS ( 19 Dec 2020 14:43 )  0.051 ng/mL / x     / x     / x     / x      CARDIAC MARKERS ( 19 Dec 2020 12:38 )  <0.015 ng/mL / x     / x     / x     / x            Blood, Urine: Negative ( @ 17:30)      PT/INR - ( 19 Dec 2020 12:38 )   PT: 12.0 sec;   INR: 1.04 ratio         PTT - ( 20 Dec 2020 06:11 )  PTT:115.2 sec    RADIOLOGY RESULTS:          Assessment and Plan:      	  The patient is a 56 yo male with Hx. of paroxysmal  Atrial fib. which  converted to NSR in 20 min. in the past, had prior hospitalizations in 2017, 2019, KT score 1 treated with ASA 81,  who presented in the ED for palpitations  started 2 hours PTA. He had recent cystoscopy with  and treated with Cipro , developed allergic reaction to Cipro, and was treated with prednisone.     assessment Dx:                       1. Atrial fib with RVR >>c/w cardizem drip; possible DCCV  later on; c/w Full dose lovenox                       2. HTN- well controlled                       3. BPH-c/w meds                       4-dvt prphy- on full dose Lovenox    note: some reaction outpatient to cipro for which patient was given iv solumederol while he was here, few doses. recco to watch off solumederol. currently no symptoms and higher risk for afib with solumderol.      Cheif complaints and Diagnosis: afib w/ rvr    Subjective: no complaints      REVIEW OF SYSTEMS:    CONSTITUTIONAL: No weakness, fevers or chills  EYES/ENT: No visual changes;  No vertigo or throat pain   NECK: No pain or stiffness  RESPIRATORY: No cough, wheezing, hemoptysis; No shortness of breath  CARDIOVASCULAR: No chest pain or palpitations  GASTROINTESTINAL: No abdominal or epigastric pain. No nausea, vomiting, or hematemesis; No diarrhea or constipation. No melena or hematochezia.  GENITOURINARY: No dysuria, frequency or hematuria  NEUROLOGICAL: No numbness or weakness  SKIN: No itching, burning, rashes, or lesions   All other review of systems is negative unless indicated above      Vital Signs Last 24 Hrs  T(C): 36.7 (19 Dec 2020 21:12), Max: 37 (19 Dec 2020 12:13)  T(F): 98 (19 Dec 2020 21:12), Max: 98.6 (19 Dec 2020 12:13)  HR: 90 (20 Dec 2020 06:40) (52 - 125)  BP: 125/89 (20 Dec 2020 06:40) (101/58 - 150/103)  BP(mean): 91 (19 Dec 2020 14:59) (91 - 117)  RR: 16 (19 Dec 2020 21:12) (16 - 19)  SpO2: 99% (19 Dec 2020 21:12) (95% - 100%)    HEENT:   pupils equal and reactive, EOMI, no oropharyngeal lesions, erythema, exudates, oral thrush    NECK:   supple, no carotid bruits, no palpable lymph nodes, no thyromegaly    CV:  +S1, +S2, regular, no murmurs or rubs    RESP:   lungs clear to auscultation bilaterally, no wheezing, rales, rhonchi, good air entry bilaterally    BREAST:  not examined    GI:  abdomen soft, non-tender, non-distended, normal BS, no bruits, no abdominal masses, no palpable masses    RECTAL:  not examined    :  not examined    MSK:   normal muscle tone, no atrophy, no rigidity, no contractions    EXT:   no clubbing, no cyanosis, no edema, no calf pain, swelling or erythema    VASCULAR:  pulses equal and symmetric in the upper and lower extremities    NEURO:  AAOX3, no focal neurological deficits, follows all commands, able to move extremities spontaneously    SKIN:  no ulcers, lesions or rashes    MEDICATIONS  (STANDING):  aspirin  chewable 81 milliGRAM(s) Oral daily  atorvastatin 10 milliGRAM(s) Oral at bedtime  diltiazem Infusion 5 mG/Hr (5 mL/Hr) IV Continuous <Continuous>  heparin  Infusion.  Unit(s)/Hr (24 mL/Hr) IV Continuous <Continuous>  methylPREDNISolone 4 milliGRAM(s) Oral before breakfast  pantoprazole    Tablet 40 milliGRAM(s) Oral before breakfast  senna 2 Tablet(s) Oral at bedtime    MEDICATIONS  (PRN):  heparin   Injectable 5000 Unit(s) IV Push every 6 hours PRN For aPTT between 40 - 57  heparin   Injectable 5000 Unit(s) IV Push every 6 hours PRN For aPTT between 40 - 57      Urinalysis Basic - ( 19 Dec 2020 17:30 )    Color: Yellow / Appearance: Clear / S.010 / pH: x  Gluc: x / Ketone: Negative  / Bili: Negative / Urobili: Negative mg/dL   Blood: x / Protein: Negative mg/dL / Nitrite: Negative   Leuk Esterase: Negative / RBC: x / WBC x   Sq Epi: x / Non Sq Epi: x / Bacteria: x    20 Dec 2020 06:11    142    |  105    |  16     ----------------------------<  127    3.9     |  30     |  1.13     Ca    9.2        20 Dec 2020 06:11  Mg     2.2       19 Dec 2020 12:38    TPro  7.7    /  Alb  3.9    /  TBili  0.5    /  DBili  x      /  AST  30     /  ALT  56     /  AlkPhos  56     19 Dec 2020 12:38  LIVER FUNCTIONS - ( 19 Dec 2020 12:38 )  Alb: 3.9 g/dL / Pro: 7.7 gm/dL / ALK PHOS: 56 U/L / ALT: 56 U/L / AST: 30 U/L / GGT: x         PT/INR - ( 19 Dec 2020 12:38 )   PT: 12.0 sec;   INR: 1.04 ratio         PTT - ( 20 Dec 2020 06:11 )  PTT:115.2 secCBC Full  -  ( 20 Dec 2020 06:11 )  WBC Count : 14.49 K/uL  Hemoglobin : 15.4 g/dL  Hematocrit : 46.3 %  Platelet Count - Automated : 265 K/uL  Mean Cell Volume : 98.3 fl  Mean Cell Hemoglobin : 32.7 pg  Mean Cell Hemoglobin Concentration : 33.3 gm/dL  Auto Neutrophil # : 10.87 K/uL  Auto Lymphocyte # : 2.35 K/uL  Auto Monocyte # : 1.12 K/uL  Auto Eosinophil # : 0.02 K/uL  Auto Basophil # : 0.07 K/uL  Auto Neutrophil % : 75.1 %  Auto Lymphocyte % : 16.2 %  Auto Monocyte % : 7.7 %  Auto Eosinophil % : 0.1 %  Auto Basophil % : 0.5 %  CARDIAC MARKERS ( 19 Dec 2020 14:43 )  0.051 ng/mL / x     / x     / x     / x      CARDIAC MARKERS ( 19 Dec 2020 12:38 )  <0.015 ng/mL / x     / x     / x     / x            Blood, Urine: Negative ( @ 17:30)      PT/INR - ( 19 Dec 2020 12:38 )   PT: 12.0 sec;   INR: 1.04 ratio         PTT - ( 20 Dec 2020 06:11 )  PTT:115.2 sec    RADIOLOGY RESULTS:          Assessment and Plan:      	  The patient is a 54 yo male with Hx. of paroxysmal  Atrial fib. which  converted to NSR in 20 min. in the past, had prior hospitalizations in 2017, 2019, KT score 1 treated with ASA 81,  who presented in the ED for palpitations  started 2 hours PTA. He had recent cystoscopy with  and treated with Cipro , developed allergic reaction to Cipro, and was treated with prednisone.     assessment Dx:                       1. Atrial fib with RVR > converted to sinus rythum around 2pm; Taper cardizem drip; STart PO cardizem 60 TID.  c/w Full dose lovenox                          - If stays in sinus rythum [place], anticiapte DC possibly with PO cardizem.                        2. HTN- well controlled                       3. BPH-c/w meds                       4-dvt prphy- on full dose Lovenox      note: some reaction outpatient to cipro for which patient was given iv solumederol while he was here, few doses. recco to watch off solumederol. currently no symptoms and higher risk for afib with solumderol.

## 2020-12-20 NOTE — CONSULT NOTE ADULT - PROBLEM SELECTOR RECOMMENDATION 9
Fair rate control; control IV diltiazem and UFH; if no spontaneous conversion to sinus rhythm then I recommend PRESLEY/DCCV + 4 weeks of anticoagulation; patient would like to wait until Tue/Wed for procedure in hopes he has another spontaneous return of sinus rhythm

## 2020-12-20 NOTE — CONSULT NOTE ADULT - SUBJECTIVE AND OBJECTIVE BOX
CHIEF COMPLAINT: Patient is a 55y old  Male who presents with a chief complaint of palpitations    HPI:  55 year old man with a history of paroxysmal  AF, HTN, Kidney stones, varicocele, Chiari malformation, and cystoscopy (last week) who presented to the ED following the abrupt onset of palpitations  -- fluttering sensation in chest, unable to identify exacerbating/alleviating factors, no associated dyspnea/angina; past episodes of AF resolved spontaneously.      PAST MEDICAL & SURGICAL HISTORY:  HTN (hypertension)  Chiari I malformation  Atrial fibrillation, unspecified type  H/O Spinal surgery    SOCIAL HISTORY:   Alcohol: Denied  Smoking: Nonsmoker    FAMILY HISTORY: Family history of diabetes mellitus, CAD, CABG, and coronary stents in father    MEDICATIONS  (STANDING):  aspirin  chewable 81 milliGRAM(s) Oral daily  atorvastatin 10 milliGRAM(s) Oral at bedtime  diltiazem Infusion 5 mG/Hr (5 mL/Hr) IV Continuous <Continuous>  heparin  Infusion.  Unit(s)/Hr (24 mL/Hr) IV Continuous <Continuous>  methylPREDNISolone 4 milliGRAM(s) Oral before breakfast  pantoprazole    Tablet 40 milliGRAM(s) Oral before breakfast  senna 2 Tablet(s) Oral at bedtime    MEDICATIONS  (PRN):  heparin   Injectable 5000 Unit(s) IV Push every 6 hours PRN For aPTT between 40 - 57  heparin   Injectable 5000 Unit(s) IV Push every 6 hours PRN For aPTT between 40 - 57    Allergies  Augmentin (Unknown)  cinnamon (Unknown)  Cipro (Unknown)  doxycycline (Rash)  Keflex (Unknown)  penicillins (Hives)  Safflower Oil (Unknown)  shellfish (Unknown)    REVIEW OF SYSTEMS:  CONSTITUTIONAL: No weakness, fevers or chills  Eyes: No visual changes  NECK: No pain or stiffness  RESPIRATORY: No cough, wheezing, hemoptysis; No shortness of breath  CARDIOVASCULAR: No chest pain; + palpitations  GASTROINTESTINAL: No abdominal pain.   GENITOURINARY: No dysuria, frequency or hematuria  NEUROLOGICAL: No numbness.  SKIN: No itching or rash  All other review of systems is negative unless indicated above    Vital Signs Last 24 Hrs  T(C): 36.7 (19 Dec 2020 21:12), Max: 37 (19 Dec 2020 12:13)  T(F): 98 (19 Dec 2020 21:12), Max: 98.6 (19 Dec 2020 12:13)  HR: 90 (20 Dec 2020 06:40) (52 - 125)  BP: 125/89 (20 Dec 2020 06:40) (101/58 - 150/103)  BP(mean): 91 (19 Dec 2020 14:59) (91 - 117)  RR: 16 (19 Dec 2020 21:12) (16 - 19)  SpO2: 99% (19 Dec 2020 21:12) (95% - 100%)    PHYSICAL EXAM:  Constitutional: NAD, awake and alert, obese  HEENT:  EOMI,  Pupils round, No oral cyanosis.  Pulmonary: Non-labored, breath sounds are clear bilaterally, No wheezing, rales or rhonchi  Cardiovascular: Irregular, normal S2, regular rate and rhythm  Gastrointestinal: Bowel Sounds present, soft, nontender.   Lymph: No peripheral edema. No cervical lymphadenopathy.  Neurological: Alert, no focal deficits  Skin: No rashes.  Psych:  Mood & affect appropriate    LABS:                     15.4   14.49 )-----------( 265      ( 20 Dec 2020 06:11 )             46.3                  142    |  105    |  16     ----------------------------<  127    3.9     |  30     |  1.13     CARDIAC MARKERS ( 19 Dec 2020 14:43 ) 0.051 ng/mL / x     / x     / x     / x      CARDIAC MARKERS ( 19 Dec 2020 12:38 ) <0.015 ng/mL / x     / x     / x     / x        Pro Bnp 28    Transthoracic Echocardiogram (09.03.19 @ 08:13):   The left ventricle is normal in size, wall thickness, wall motion and contractility. Estimated left ventricular ejection fraction is 55-60 %.   Normal appearing left atrium.   Normal appearing right atrium.   Normal appearing right ventricle structure and function.   Normal aortic valve structure and function.   Normal appearing mitral valve structure and function. Trace mitral regurgitation is present.   Normal appearing tricuspid valve structure and function.   Normal appearing pulmonic valve structure and function.   No evidence of pericardial effusion.    NM Nuclear Stress Multiple (09.05.19 @ 11:09):  Normal SPECT Myocardial Perfusion Imaging. No scan evidence of reversible or fixed perfusion defects. Normal left ventricular contractility with an ejection fraction of 69%     Tele: AF

## 2020-12-21 ENCOUNTER — TRANSCRIPTION ENCOUNTER (OUTPATIENT)
Age: 55
End: 2020-12-21

## 2020-12-21 VITALS
OXYGEN SATURATION: 98 % | DIASTOLIC BLOOD PRESSURE: 78 MMHG | HEART RATE: 87 BPM | SYSTOLIC BLOOD PRESSURE: 127 MMHG | RESPIRATION RATE: 16 BRPM

## 2020-12-21 DIAGNOSIS — N40.1 BENIGN PROSTATIC HYPERPLASIA WITH LOWER URINARY TRACT SYMPTOMS: ICD-10-CM

## 2020-12-21 LAB
HCT VFR BLD CALC: 46.7 % — SIGNIFICANT CHANGE UP (ref 39–50)
HGB BLD-MCNC: 16 G/DL — SIGNIFICANT CHANGE UP (ref 13–17)
MCHC RBC-ENTMCNC: 33.3 PG — SIGNIFICANT CHANGE UP (ref 27–34)
MCHC RBC-ENTMCNC: 34.3 GM/DL — SIGNIFICANT CHANGE UP (ref 32–36)
MCV RBC AUTO: 97.3 FL — SIGNIFICANT CHANGE UP (ref 80–100)
PLATELET # BLD AUTO: 244 K/UL — SIGNIFICANT CHANGE UP (ref 150–400)
RBC # BLD: 4.8 M/UL — SIGNIFICANT CHANGE UP (ref 4.2–5.8)
RBC # FLD: 14.6 % — HIGH (ref 10.3–14.5)
WBC # BLD: 10.69 K/UL — HIGH (ref 3.8–10.5)
WBC # FLD AUTO: 10.69 K/UL — HIGH (ref 3.8–10.5)

## 2020-12-21 PROCEDURE — 99253 IP/OBS CNSLTJ NEW/EST LOW 45: CPT

## 2020-12-21 PROCEDURE — 99239 HOSP IP/OBS DSCHRG MGMT >30: CPT

## 2020-12-21 RX ORDER — DILTIAZEM HCL 120 MG
180 CAPSULE, EXT RELEASE 24 HR ORAL DAILY
Refills: 0 | Status: DISCONTINUED | OUTPATIENT
Start: 2020-12-22 | End: 2020-12-21

## 2020-12-21 RX ORDER — DILTIAZEM HCL 120 MG
180 CAPSULE, EXT RELEASE 24 HR ORAL DAILY
Refills: 0 | Status: DISCONTINUED | OUTPATIENT
Start: 2020-12-21 | End: 2020-12-21

## 2020-12-21 RX ORDER — DILTIAZEM HCL 120 MG
120 CAPSULE, EXT RELEASE 24 HR ORAL ONCE
Refills: 0 | Status: COMPLETED | OUTPATIENT
Start: 2020-12-21 | End: 2020-12-21

## 2020-12-21 RX ADMIN — Medication 120 MILLIGRAM(S): at 13:08

## 2020-12-21 RX ADMIN — ENOXAPARIN SODIUM 140 MILLIGRAM(S): 100 INJECTION SUBCUTANEOUS at 10:33

## 2020-12-21 RX ADMIN — PANTOPRAZOLE SODIUM 40 MILLIGRAM(S): 20 TABLET, DELAYED RELEASE ORAL at 10:33

## 2020-12-21 RX ADMIN — Medication 81 MILLIGRAM(S): at 10:32

## 2020-12-21 RX ADMIN — Medication 60 MILLIGRAM(S): at 08:05

## 2020-12-21 NOTE — DISCHARGE NOTE PROVIDER - NSDCMRMEDTOKEN_GEN_ALL_CORE_FT
aspirin 81 mg oral tablet, chewable: 1 tab(s) orally once a day  atorvastatin 10 mg oral tablet: 1 tab(s) orally once a day (at bedtime)  dilTIAZem 180 mg/24 hours oral capsule, extended release: 1 cap(s) orally once a day  pantoprazole 40 mg oral delayed release tablet: 1 tab(s) orally once a day  senna oral tablet: 2 tab(s) orally once a day (at bedtime)

## 2020-12-21 NOTE — DISCHARGE NOTE NURSING/CASE MANAGEMENT/SOCIAL WORK - PATIENT PORTAL LINK FT
You can access the FollowMyHealth Patient Portal offered by [facility] by registering at the following website: [email or web address]. By joining [facility] [facility]’s FollowMyHealth portal, you will also be able to view your health information using other applications (apps) compatible with our system.

## 2020-12-21 NOTE — DISCHARGE NOTE PROVIDER - CARE PROVIDERS DIRECT ADDRESSES
[email or web address]

## 2020-12-21 NOTE — CONSULT NOTE ADULT - PROBLEM SELECTOR PROBLEM 1
Benign prostatic hyperplasia with lower urinary tract symptoms, symptom details unspecified
Paroxysmal atrial fibrillation

## 2020-12-21 NOTE — DISCHARGE NOTE PROVIDER - CARE PROVIDER_API CALL
Yoel Rios  CARDIOVASCULAR DISEASE  [address] [place] [address], [address] [address]  Phone: [phone]  Fax: [phone]  Follow Up Time:    Yoel Rios  CARDIOVASCULAR DISEASE  [address], [address]  [place] [address]  Phone: [phone]  Fax: [phone]  Follow Up Time:     Terrance Britton  UROLOGY  [address] [place] [address]  Phone: [phone]  Fax: [phone]  Follow Up Time:

## 2020-12-21 NOTE — CONSULT NOTE ADULT - ASSESSMENT
Endorses on and off hesitancy, intermittent, urgency supra pubic pressure and nocturia 2 x.   Saw Dr Britton, underwent Cystoscopy and Prostate US.   Prescribed Finasteride, did not start concerned about se.   Recommended try Flomax once cardiac condition stable.   Discussed SE profile.     Follow up in 4 weeks.  Discussed treatment options.   Recommended try Flomax once cardiac condition stable.   Discussed SE profile.     Follow up in 4 weeks.

## 2020-12-21 NOTE — DISCHARGE NOTE PROVIDER - HOSPITAL COURSE
Assessment and Plan:    The patient is a 56 yo male with Hx. of paroxysmal  Atrial fib. which  converted to NSR in 20 min. in the past, had prior hospitalizations in 2017, 9/2019, KT score 1 treated with ASA 81,  who presented in the ED for palpitations  started 2 hours PTA. He had recent cystoscopy with  and treated with Cipro , developed allergic reaction to Cipro, and was treated with prednisone.                          1. Atrial fib with RVR > converted to sinus rythum around 2pm; now off cardizem drip; cont oral cardizem. on lovenox bid but chads is low and was on baby aspirin prior to admission. will resume aspirin                       2. HTN- well controlled                       3. BPH-c/w meds. follow up with urologist in 4 weeks.                        4-some reaction outpatient to cipro for which patient was given iv solumederol while he was here, few doses. recco to watch off solumederol. currently no symptoms and higher risk for afib with solumderol.       T(C): 36.5 (12-20-20 @ 21:06), Max: 36.5 (12-20-20 @ 21:06)  HR: 87 (12-21-20 @ 08:33) (87 - 95)  BP: 127/78 (12-21-20 @ 08:33) (124/70 - 127/78)  RR: 16 (12-21-20 @ 08:33) (16 - 16)  SpO2: 98% (12-21-20 @ 08:33) (97% - 98%)    Gen - Pleasant, cooperative, in no acute distress  HEENT- PERRL, moist mucus membranes, OP clear  CV - RRR, No m/r/g, +pulses, no edema  Lungs - Good effort, Clear to auscultation bilaterally  Abdomen - Soft, nontender, nondistended, +BS, No rebound/rigidity/guarding  Ext - No cyanosis/clubbing.   Skin - No rashes, No jaundice  Psych- Alert & oriented x 3  Neuro- fluent speech, no facial droop, EOMI. moves all ext    Dispo: discharge to home in stable condition    Final diagnosis, treatment plan, and follow-up recommendations were discussed and explained to the patient. The patient was given an opportunity to ask questions concerning the diagnosis and treatment plan. The patient acknowledged understanding of the diagnosis, treatment, and follow-up recommendations. The patient was advised to seek urgent care upon discharge if worsening symptoms develop prior to scheduled follow-up. Time spent on discharge included time with the patient, and also coordinating discharge care as outlined below.    Total time spent: 50 min

## 2020-12-21 NOTE — DISCHARGE NOTE PROVIDER - INSTRUCTIONS
Low Sodium. Low Cholesterol  Choose foods that are low in salt - examples include: fresh meats, poultry, fish, eggs, milk and yogurt. Avoid packaged/processed foods and excessive table salt use.

## 2020-12-21 NOTE — DISCHARGE NOTE PROVIDER - NSDCCPCAREPLAN_GEN_ALL_CORE_FT
PRINCIPAL DISCHARGE DIAGNOSIS  Diagnosis: Atrial fibrillation, unspecified type  Assessment and Plan of Treatment: You were found to have Atrial fibrillation (AFIB) which is a cardiac arrhythmia. An arrhythmia is a problem with the speed or rhythm of the heartbeat and atrial fibrillation is the most common type of arrhythmia. The cause is a disorder in the heart's electrical system. To manage atrial fibrillation we recommend you continue to follow a heart healthy diet and maintain a healthy weight, do not smoke -- nicotine can cause heart damage and make it more difficult to manage your a-fib, limit alcohol intake and manage other health conditions -- this includes high blood pressure or cholesterol, sleep apnea, diabetes, and other heart conditions. Take medicine as directed and follow your treatment plan  - Continue to take Cardizem 180mg once a day to control your heart rate/rhythm.  - Continue to take ___ for stroke prevention.   - Monitor your blood pressure and heart rate at home and keep a log for your cardiologist.   - Follow up with your cardiologist Dr. Conner in 1 to 2 weeks after discharge for further management - Call to make an appointment. **Monitor for the following signs/symptoms: palpitations, chest pain or shortness of breath. If you experience any of these signs/symptoms please alert your primary care provider or return to the ED if your symptoms are severe**       PRINCIPAL DISCHARGE DIAGNOSIS  Diagnosis: Atrial fibrillation, unspecified type  Assessment and Plan of Treatment: You were found to have Atrial fibrillation (AFIB) which is a cardiac arrhythmia. An arrhythmia is a problem with the speed or rhythm of the heartbeat and atrial fibrillation is the most common type of arrhythmia. The cause is a disorder in the heart's electrical system. To manage atrial fibrillation we recommend you continue to follow a heart healthy diet and maintain a healthy weight, do not smoke -- nicotine can cause heart damage and make it more difficult to manage your a-fib, limit alcohol intake and manage other health conditions -- this includes high blood pressure or cholesterol, sleep apnea, diabetes, and other heart conditions. Take medicine as directed and follow your treatment plan  - Continue to take Cardizem 180mg once a day to control your heart rate/rhythm.  - Continue to take aspirin for stroke prevention.   - Monitor your blood pressure and heart rate at home and keep a log for your cardiologist.   - Follow up with your cardiologist in 1 to 2 weeks after discharge for further management - Call to make an appointment. **Monitor for the following signs/symptoms: palpitations, chest pain or shortness of breath. If you experience any of these signs/symptoms please alert your primary care provider or return to the ED if your symptoms are severe**      SECONDARY DISCHARGE DIAGNOSES  Diagnosis: BPH (benign prostatic hyperplasia)  Assessment and Plan of Treatment: follow up with urologist in 4 weeks

## 2020-12-21 NOTE — CONSULT NOTE ADULT - SUBJECTIVE AND OBJECTIVE BOX
CHIEF COMPLAINT:    HISTORY OF PRESENT ILLNESS:    PAST MEDICAL & SURGICAL HISTORY:  HTN (hypertension)    Chiari I malformation    Atrial fibrillation, unspecified type    H/O Spinal surgery        REVIEW OF SYSTEMS:    CONSTITUTIONAL: No weakness, fevers or chills  EYES/ENT: No visual changes;  No vertigo or throat pain   NECK: No pain or stiffness  RESPIRATORY: No cough, wheezing, hemoptysis, No shortness of breath  CARDIOVASCULAR: No chest pain or palpitations  GASTROINTESTINAL: No abdominal or flank pain, No nausea, vomiting, diarrhea or constipation  GENITOURINARY: See HPI  NEUROLOGICAL: No numbness or weakness  SKIN: No rashes or lesions   All other review of systems is negative unless indicated above.    MEDICATIONS  (STANDING):  aspirin  chewable 81 milliGRAM(s) Oral daily  atorvastatin 10 milliGRAM(s) Oral at bedtime  diltiazem    Tablet 60 milliGRAM(s) Oral three times a day  diltiazem Infusion 5 mG/Hr (5 mL/Hr) IV Continuous <Continuous>  enoxaparin Injectable 140 milliGRAM(s) SubCutaneous two times a day  pantoprazole    Tablet 40 milliGRAM(s) Oral before breakfast  senna 2 Tablet(s) Oral at bedtime    MEDICATIONS  (PRN):      Allergies    Augmentin (Unknown)  cinnamon (Unknown)  Cipro (Unknown)  doxycycline (Rash)  Keflex (Unknown)  penicillins (Hives)  Safflower Oil (Unknown)  shellfish (Unknown)    Intolerances        SOCIAL HISTORY:    FAMILY HISTORY:  Family history of diabetes mellitus in father        Vital Signs Last 24 Hrs  T(C): 36.5 (20 Dec 2020 21:06), Max: 36.5 (20 Dec 2020 21:06)  T(F): 97.7 (20 Dec 2020 21:06), Max: 97.7 (20 Dec 2020 21:06)  HR: 95 (20 Dec 2020 21:06) (95 - 95)  BP: 124/70 (20 Dec 2020 21:06) (124/70 - 124/70)  BP(mean): --  RR: 16 (20 Dec 2020 21:06) (16 - 16)  SpO2: 97% (20 Dec 2020 21:06) (97% - 97%)    PHYSICAL EXAM:    Constitutional: No acute distress  HEENT: EOMI, Normal Hearing  Neck: Supple  Back: No costovertebral angle tenderness  Respiratory: Normal respiratory effort    Cardiovascular: Normal peripheral circulation   Abd: Soft, non distended, non tender  : Normal urethra, ***circumcised penis, bilateral normal to palpate  PADMINI: Prostate- *** gms, non tender, no nodules  Extremities: No peripheral edema  Neurological: No focal deficits  Psychiatric: Normal mood, normal affect  Musculoskeletal: Moving all 4 extremities  Skin: No rashes    LABS:                        15.4   14.49 )-----------( 265      ( 20 Dec 2020 06:11 )             46.3         142  |  105  |  16  ----------------------------<  127<H>  3.9   |  30  |  1.13    Ca    9.2      20 Dec 2020 06:11  Mg     2.2         TPro  7.7  /  Alb  3.9  /  TBili  0.5  /  DBili  x   /  AST  30  /  ALT  56  /  AlkPhos  56  -    PT/INR - ( 19 Dec 2020 12:38 )   PT: 12.0 sec;   INR: 1.04 ratio         PTT - ( 20 Dec 2020 06:11 )  PTT:115.2 sec  Urinalysis Basic - ( 19 Dec 2020 17:30 )    Color: Yellow / Appearance: Clear / S.010 / pH: x  Gluc: x / Ketone: Negative  / Bili: Negative / Urobili: Negative mg/dL   Blood: x / Protein: Negative mg/dL / Nitrite: Negative   Leuk Esterase: Negative / RBC: x / WBC x   Sq Epi: x / Non Sq Epi: x / Bacteria: x      Urine Culture:     RADIOLOGY & ADDITIONAL STUDIES: CHIEF COMPLAINT:  BPH    HISTORY OF PRESENT ILLNESS:  54 yo male with Hx. of paroxysmal  Atrial fib. which  converted to NSR in 20 min. in the past, had prior hospitalizations in 2017, 2019, KT score 1 treated with ASA 81,  who presented in the ED for palpitations  started 2 hours PTA. He had recent cystoscopy with  and treated with Cipro , developed allergic reaction to Cipro, and was treated with prednisone.   Was prescribed Finasteride, did not start: concerned about side effects.   Endorses on and off hesitancy, intermittent, urgency supra pubic pressure and nocturia 2 x.   Saw Dr Britton, underwent Cystoscopy and Prostate US.   Father has Prostate cancer.     PAST MEDICAL & SURGICAL HISTORY:  HTN (hypertension)    Chiari I malformation    Atrial fibrillation, unspecified type    H/O Spinal surgery        REVIEW OF SYSTEMS:  All other review of systems is negative unless indicated above.    MEDICATIONS  (STANDING):  aspirin  chewable 81 milliGRAM(s) Oral daily  atorvastatin 10 milliGRAM(s) Oral at bedtime  diltiazem    Tablet 60 milliGRAM(s) Oral three times a day  diltiazem Infusion 5 mG/Hr (5 mL/Hr) IV Continuous <Continuous>  enoxaparin Injectable 140 milliGRAM(s) SubCutaneous two times a day  pantoprazole    Tablet 40 milliGRAM(s) Oral before breakfast  senna 2 Tablet(s) Oral at bedtime    MEDICATIONS  (PRN):      Allergies    Augmentin (Unknown)  cinnamon (Unknown)  Cipro (Unknown)  doxycycline (Rash)  Keflex (Unknown)  penicillins (Hives)  Safflower Oil (Unknown)  shellfish (Unknown)    Intolerances        SOCIAL HISTORY:    FAMILY HISTORY:  Family history of diabetes mellitus in father        Vital Signs Last 24 Hrs  T(C): 36.5 (20 Dec 2020 21:06), Max: 36.5 (20 Dec 2020 21:06)  T(F): 97.7 (20 Dec 2020 21:06), Max: 97.7 (20 Dec 2020 21:06)  HR: 95 (20 Dec 2020 21:06) (95 - 95)  BP: 124/70 (20 Dec 2020 21:06) (124/70 - 124/70)  BP(mean): --  RR: 16 (20 Dec 2020 21:06) (16 - 16)  SpO2: 97% (20 Dec 2020 21:06) (97% - 97%)    PHYSICAL EXAM:    Constitutional: No acute distress  HEENT: EOMI, Normal Hearing  Neck: Supple  Back: No costovertebral angle tenderness  Respiratory: Normal respiratory effort    Cardiovascular: Normal peripheral circulation   Abd: Soft, non distended, non tender  : Normal urethra, penis  Extremities: No peripheral edema  Neurological: No focal deficits  Psychiatric: Normal mood, normal affect  Musculoskeletal: Moving all 4 extremities  Skin: No rashes    LABS:                        15.4   14.49 )-----------( 265      ( 20 Dec 2020 06:11 )             46.3     12    142  |  105  |  16  ----------------------------<  127<H>  3.9   |  30  |  1.13    Ca    9.2      20 Dec 2020 06:11  Mg     2.2     -    TPro  7.7  /  Alb  3.9  /  TBili  0.5  /  DBili  x   /  AST  30  /  ALT  56  /  AlkPhos  56  12-    PT/INR - ( 19 Dec 2020 12:38 )   PT: 12.0 sec;   INR: 1.04 ratio         PTT - ( 20 Dec 2020 06:11 )  PTT:115.2 sec  Urinalysis Basic - ( 19 Dec 2020 17:30 )    Color: Yellow / Appearance: Clear / S.010 / pH: x  Gluc: x / Ketone: Negative  / Bili: Negative / Urobili: Negative mg/dL   Blood: x / Protein: Negative mg/dL / Nitrite: Negative   Leuk Esterase: Negative / RBC: x / WBC x   Sq Epi: x / Non Sq Epi: x / Bacteria: x

## 2020-12-24 DIAGNOSIS — N40.0 BENIGN PROSTATIC HYPERPLASIA WITHOUT LOWER URINARY TRACT SYMPTOMS: ICD-10-CM

## 2020-12-24 DIAGNOSIS — I10 ESSENTIAL (PRIMARY) HYPERTENSION: ICD-10-CM

## 2020-12-24 DIAGNOSIS — I48.0 PAROXYSMAL ATRIAL FIBRILLATION: ICD-10-CM

## 2021-01-07 ENCOUNTER — APPOINTMENT (OUTPATIENT)
Dept: UROLOGY | Facility: CLINIC | Age: 56
End: 2021-01-07

## 2021-07-01 ENCOUNTER — EMERGENCY (EMERGENCY)
Facility: HOSPITAL | Age: 56
LOS: 0 days | Discharge: HOME | End: 2021-07-01
Attending: EMERGENCY MEDICINE
Payer: COMMERCIAL

## 2021-07-01 VITALS
RESPIRATION RATE: 18 BRPM | OXYGEN SATURATION: 98 % | DIASTOLIC BLOOD PRESSURE: 95 MMHG | SYSTOLIC BLOOD PRESSURE: 158 MMHG | TEMPERATURE: 99 F | HEART RATE: 96 BPM

## 2021-07-01 VITALS — HEIGHT: 74 IN

## 2021-07-01 DIAGNOSIS — R10.9 UNSPECIFIED ABDOMINAL PAIN: ICD-10-CM

## 2021-07-01 DIAGNOSIS — Z91.018 ALLERGY TO OTHER FOODS: ICD-10-CM

## 2021-07-01 DIAGNOSIS — N23 UNSPECIFIED RENAL COLIC: ICD-10-CM

## 2021-07-01 DIAGNOSIS — I48.91 UNSPECIFIED ATRIAL FIBRILLATION: ICD-10-CM

## 2021-07-01 DIAGNOSIS — Z88.0 ALLERGY STATUS TO PENICILLIN: ICD-10-CM

## 2021-07-01 DIAGNOSIS — I10 ESSENTIAL (PRIMARY) HYPERTENSION: ICD-10-CM

## 2021-07-01 DIAGNOSIS — G93.5 COMPRESSION OF BRAIN: ICD-10-CM

## 2021-07-01 DIAGNOSIS — Z91.013 ALLERGY TO SEAFOOD: ICD-10-CM

## 2021-07-01 DIAGNOSIS — K59.00 CONSTIPATION, UNSPECIFIED: ICD-10-CM

## 2021-07-01 DIAGNOSIS — Z98.890 OTHER SPECIFIED POSTPROCEDURAL STATES: Chronic | ICD-10-CM

## 2021-07-01 DIAGNOSIS — Z88.1 ALLERGY STATUS TO OTHER ANTIBIOTIC AGENTS STATUS: ICD-10-CM

## 2021-07-01 DIAGNOSIS — Z88.8 ALLERGY STATUS TO OTHER DRUGS, MEDICAMENTS AND BIOLOGICAL SUBSTANCES STATUS: ICD-10-CM

## 2021-07-01 LAB
ALBUMIN SERPL ELPH-MCNC: 4.3 G/DL — SIGNIFICANT CHANGE UP (ref 3.3–5)
ALP SERPL-CCNC: 53 U/L — SIGNIFICANT CHANGE UP (ref 40–120)
ALT FLD-CCNC: 35 U/L — SIGNIFICANT CHANGE UP (ref 12–78)
ANION GAP SERPL CALC-SCNC: 6 MMOL/L — SIGNIFICANT CHANGE UP (ref 5–17)
APPEARANCE UR: CLEAR — SIGNIFICANT CHANGE UP
AST SERPL-CCNC: 20 U/L — SIGNIFICANT CHANGE UP (ref 15–37)
BASOPHILS # BLD AUTO: 0.09 K/UL — SIGNIFICANT CHANGE UP (ref 0–0.2)
BASOPHILS NFR BLD AUTO: 1 % — SIGNIFICANT CHANGE UP (ref 0–2)
BILIRUB SERPL-MCNC: 0.7 MG/DL — SIGNIFICANT CHANGE UP (ref 0.2–1.2)
BILIRUB UR-MCNC: NEGATIVE — SIGNIFICANT CHANGE UP
BUN SERPL-MCNC: 12 MG/DL — SIGNIFICANT CHANGE UP (ref 7–23)
CALCIUM SERPL-MCNC: 9.2 MG/DL — SIGNIFICANT CHANGE UP (ref 8.5–10.1)
CHLORIDE SERPL-SCNC: 108 MMOL/L — SIGNIFICANT CHANGE UP (ref 96–108)
CO2 SERPL-SCNC: 24 MMOL/L — SIGNIFICANT CHANGE UP (ref 22–31)
COLOR SPEC: YELLOW — SIGNIFICANT CHANGE UP
CREAT SERPL-MCNC: 1.02 MG/DL — SIGNIFICANT CHANGE UP (ref 0.5–1.3)
DIFF PNL FLD: NEGATIVE — SIGNIFICANT CHANGE UP
EOSINOPHIL # BLD AUTO: 0.09 K/UL — SIGNIFICANT CHANGE UP (ref 0–0.5)
EOSINOPHIL NFR BLD AUTO: 1 % — SIGNIFICANT CHANGE UP (ref 0–6)
GLUCOSE SERPL-MCNC: 87 MG/DL — SIGNIFICANT CHANGE UP (ref 70–99)
GLUCOSE UR QL: NEGATIVE MG/DL — SIGNIFICANT CHANGE UP
HCT VFR BLD CALC: 47.1 % — SIGNIFICANT CHANGE UP (ref 39–50)
HGB BLD-MCNC: 16.3 G/DL — SIGNIFICANT CHANGE UP (ref 13–17)
KETONES UR-MCNC: NEGATIVE — SIGNIFICANT CHANGE UP
LEUKOCYTE ESTERASE UR-ACNC: NEGATIVE — SIGNIFICANT CHANGE UP
LIDOCAIN IGE QN: 95 U/L — SIGNIFICANT CHANGE UP (ref 73–393)
LYMPHOCYTES # BLD AUTO: 1.71 K/UL — SIGNIFICANT CHANGE UP (ref 1–3.3)
LYMPHOCYTES # BLD AUTO: 20 % — SIGNIFICANT CHANGE UP (ref 13–44)
MCHC RBC-ENTMCNC: 33.1 PG — SIGNIFICANT CHANGE UP (ref 27–34)
MCHC RBC-ENTMCNC: 34.6 GM/DL — SIGNIFICANT CHANGE UP (ref 32–36)
MCV RBC AUTO: 95.5 FL — SIGNIFICANT CHANGE UP (ref 80–100)
MONOCYTES # BLD AUTO: 0.34 K/UL — SIGNIFICANT CHANGE UP (ref 0–0.9)
MONOCYTES NFR BLD AUTO: 4 % — SIGNIFICANT CHANGE UP (ref 2–14)
NEUTROPHILS # BLD AUTO: 6.33 K/UL — SIGNIFICANT CHANGE UP (ref 1.8–7.4)
NEUTROPHILS NFR BLD AUTO: 74 % — SIGNIFICANT CHANGE UP (ref 43–77)
NITRITE UR-MCNC: NEGATIVE — SIGNIFICANT CHANGE UP
NRBC # BLD: SIGNIFICANT CHANGE UP /100 WBCS (ref 0–0)
PH UR: 6 — SIGNIFICANT CHANGE UP (ref 5–8)
PLATELET # BLD AUTO: 212 K/UL — SIGNIFICANT CHANGE UP (ref 150–400)
POTASSIUM SERPL-MCNC: 4.1 MMOL/L — SIGNIFICANT CHANGE UP (ref 3.5–5.3)
POTASSIUM SERPL-SCNC: 4.1 MMOL/L — SIGNIFICANT CHANGE UP (ref 3.5–5.3)
PROT SERPL-MCNC: 7.5 GM/DL — SIGNIFICANT CHANGE UP (ref 6–8.3)
PROT UR-MCNC: NEGATIVE MG/DL — SIGNIFICANT CHANGE UP
RBC # BLD: 4.93 M/UL — SIGNIFICANT CHANGE UP (ref 4.2–5.8)
RBC # FLD: 14.6 % — HIGH (ref 10.3–14.5)
SODIUM SERPL-SCNC: 138 MMOL/L — SIGNIFICANT CHANGE UP (ref 135–145)
SP GR SPEC: 1.01 — SIGNIFICANT CHANGE UP (ref 1.01–1.02)
UROBILINOGEN FLD QL: NEGATIVE MG/DL — SIGNIFICANT CHANGE UP
WBC # BLD: 8.55 K/UL — SIGNIFICANT CHANGE UP (ref 3.8–10.5)
WBC # FLD AUTO: 8.55 K/UL — SIGNIFICANT CHANGE UP (ref 3.8–10.5)

## 2021-07-01 PROCEDURE — 83690 ASSAY OF LIPASE: CPT

## 2021-07-01 PROCEDURE — 99285 EMERGENCY DEPT VISIT HI MDM: CPT

## 2021-07-01 PROCEDURE — 36415 COLL VENOUS BLD VENIPUNCTURE: CPT

## 2021-07-01 PROCEDURE — 85025 COMPLETE CBC W/AUTO DIFF WBC: CPT

## 2021-07-01 PROCEDURE — 99284 EMERGENCY DEPT VISIT MOD MDM: CPT | Mod: 25

## 2021-07-01 PROCEDURE — G1004: CPT

## 2021-07-01 PROCEDURE — 74176 CT ABD & PELVIS W/O CONTRAST: CPT | Mod: 26,ME

## 2021-07-01 PROCEDURE — 80053 COMPREHEN METABOLIC PANEL: CPT

## 2021-07-01 PROCEDURE — 74176 CT ABD & PELVIS W/O CONTRAST: CPT

## 2021-07-01 PROCEDURE — 81003 URINALYSIS AUTO W/O SCOPE: CPT

## 2021-07-01 PROCEDURE — 87086 URINE CULTURE/COLONY COUNT: CPT

## 2021-07-01 NOTE — ED STATDOCS - PROGRESS NOTE DETAILS
55 y/o M with PMH of a-fib, HTN, kidney stones presents with right sided flank pain x 2 days. Pt expressed concern he was having a reaction to a new medication. Reports feeling "burning sensation throughout my body." Notes he took ivermectin which may have caused the reaction. Took a zyrtec afterwards, and noted improvement in burning sensation. Flank pain has persisted. Denies fever, chills, nausea, vomiting, rash. PE: Well appearing. Cardiac: s1s2, RRR> Lungs: CTAB. Abdomen: NBS x4, soft, nontender. No CVAT. A/P: r/o kidney stone. plan for labs, IVF, analgesia PRN, CT, reassess. - Tavon Tang PA-C Labs and CT reviewed with patient. States he's been evaluated previously for left kidney lesion in past, was told it was benign. Will dc at this time. - Tavon Tang PA-C

## 2021-07-01 NOTE — ED ADULT TRIAGE NOTE - CHIEF COMPLAINT QUOTE
pt presents to ED due to allergic reaction to a medication he started yesterday started feeling weakness pt states it was a medicine he was starting to take prophylactic to help fight COVID

## 2021-07-01 NOTE — ED ADULT NURSE NOTE - OBJECTIVE STATEMENT
Pt took medication to prevent covid last night. Medication was prescribed through telehealth doctor. Pt began feeling right abdominal/flank pain. Pt thought he was having an allergic reaction and took zyrtec with no relief. Pt denies urinary symptoms, GI symptoms, fever, chills, chest pain, nausea, vomitting.

## 2021-07-01 NOTE — ED ADULT NURSE NOTE - FAMILY HISTORY
Father  Still living? Unknown  Family history of diabetes mellitus in father, Age at diagnosis: Age Unknown

## 2021-07-01 NOTE — ED STATDOCS - OBJECTIVE STATEMENT
57 y/o male with a PMHx of A-Fib, HTN, Chiari malformation presents to the ED c/o right sided flank pain since yesterday. Pt reports he took 12mg ivermectin at 1:30pm, then felt burning sensation throughout his body. Due to reaction took a Zyrtec 10mg at 2:30pm. Pt is taking ivermectin "prophylactically for COVID-19 and vaccine side effects." Still having right sided flank pain so came into ED. Denies fever. +constipation, but had a BM this morning.

## 2021-07-01 NOTE — ED STATDOCS - NSFOLLOWUPINSTRUCTIONS_ED_ALL_ED_FT
Flank Pain, Adult      Flank pain is pain in your side. The flank is the area of your side between your upper belly (abdomen) and your back. The pain may occur over a short time (acute), or it may be long-term or come back often (chronic). It may be mild or very bad. Pain in this area can be caused by many different things.      Follow these instructions at home:     •Drink enough fluid to keep your pee (urine) clear or pale yellow.      •Rest as told by your doctor.      •Take over-the-counter and prescription medicines only as told by your doctor.    •Keep a journal to keep track of:  •What has caused your flank pain.      •What has made it feel better.        •Keep all follow-up visits as told by your doctor. This is important.        Contact a doctor if:    •Medicine does not help your pain.      •You have new symptoms.      •Your pain gets worse.      •You have a fever.      •Your symptoms last longer than 2–3 days.      •You have trouble peeing.      •You are peeing more often than normal.        Get help right away if:    •You have trouble breathing.      •You are short of breath.      •Your belly hurts, or it is swollen or red.      •You feel sick to your stomach (nauseous).      •You throw up (vomit).      •You feel like you will pass out, or you do pass out (faint).      •You have blood in your pee.        Summary    •Flank pain is pain in your side. The flank is the area of your side between your upper belly (abdomen) and your back.      •Flank pain may occur over a short time (acute), or it may be long-term or come back often (chronic). It may be mild or very bad.      •Pain in this area can be caused by many different things.      •Contact your doctor if your symptoms get worse or they last longer than 2–3 days.      This information is not intended to replace advice given to you by your health care provider. Make sure you discuss any questions you have with your health care provider.

## 2021-07-02 LAB
CULTURE RESULTS: SIGNIFICANT CHANGE UP
SPECIMEN SOURCE: SIGNIFICANT CHANGE UP

## 2021-08-25 ENCOUNTER — OUTPATIENT (OUTPATIENT)
Dept: OUTPATIENT SERVICES | Facility: HOSPITAL | Age: 56
LOS: 1 days | End: 2021-08-25
Payer: COMMERCIAL

## 2021-08-25 ENCOUNTER — APPOINTMENT (OUTPATIENT)
Dept: CT IMAGING | Facility: CLINIC | Age: 56
End: 2021-08-25
Payer: COMMERCIAL

## 2021-08-25 DIAGNOSIS — Z98.890 OTHER SPECIFIED POSTPROCEDURAL STATES: Chronic | ICD-10-CM

## 2021-08-25 DIAGNOSIS — Z00.8 ENCOUNTER FOR OTHER GENERAL EXAMINATION: ICD-10-CM

## 2021-08-25 PROCEDURE — 74178 CT ABD&PLV WO CNTR FLWD CNTR: CPT

## 2021-08-25 PROCEDURE — 74178 CT ABD&PLV WO CNTR FLWD CNTR: CPT | Mod: 26

## 2021-08-25 PROCEDURE — 82565 ASSAY OF CREATININE: CPT

## 2022-07-08 ENCOUNTER — NON-APPOINTMENT (OUTPATIENT)
Age: 57
End: 2022-07-08

## 2022-12-08 ENCOUNTER — NON-APPOINTMENT (OUTPATIENT)
Age: 57
End: 2022-12-08

## 2022-12-09 ENCOUNTER — EMERGENCY (EMERGENCY)
Facility: HOSPITAL | Age: 57
LOS: 0 days | Discharge: HOME | End: 2022-12-09
Attending: STUDENT IN AN ORGANIZED HEALTH CARE EDUCATION/TRAINING PROGRAM
Payer: COMMERCIAL

## 2022-12-09 VITALS
OXYGEN SATURATION: 95 % | HEART RATE: 105 BPM | TEMPERATURE: 98 F | DIASTOLIC BLOOD PRESSURE: 78 MMHG | RESPIRATION RATE: 18 BRPM | SYSTOLIC BLOOD PRESSURE: 114 MMHG

## 2022-12-09 VITALS
SYSTOLIC BLOOD PRESSURE: 143 MMHG | OXYGEN SATURATION: 95 % | DIASTOLIC BLOOD PRESSURE: 94 MMHG | RESPIRATION RATE: 18 BRPM | TEMPERATURE: 98 F | HEART RATE: 131 BPM

## 2022-12-09 DIAGNOSIS — G93.5 COMPRESSION OF BRAIN: ICD-10-CM

## 2022-12-09 DIAGNOSIS — Z91.018 ALLERGY TO OTHER FOODS: ICD-10-CM

## 2022-12-09 DIAGNOSIS — Z20.822 CONTACT WITH AND (SUSPECTED) EXPOSURE TO COVID-19: ICD-10-CM

## 2022-12-09 DIAGNOSIS — R82.4 ACETONURIA: ICD-10-CM

## 2022-12-09 DIAGNOSIS — R19.7 DIARRHEA, UNSPECIFIED: ICD-10-CM

## 2022-12-09 DIAGNOSIS — Z91.013 ALLERGY TO SEAFOOD: ICD-10-CM

## 2022-12-09 DIAGNOSIS — Z88.0 ALLERGY STATUS TO PENICILLIN: ICD-10-CM

## 2022-12-09 DIAGNOSIS — K52.9 NONINFECTIVE GASTROENTERITIS AND COLITIS, UNSPECIFIED: ICD-10-CM

## 2022-12-09 DIAGNOSIS — R00.0 TACHYCARDIA, UNSPECIFIED: ICD-10-CM

## 2022-12-09 DIAGNOSIS — R50.9 FEVER, UNSPECIFIED: ICD-10-CM

## 2022-12-09 DIAGNOSIS — Z79.82 LONG TERM (CURRENT) USE OF ASPIRIN: ICD-10-CM

## 2022-12-09 DIAGNOSIS — I48.91 UNSPECIFIED ATRIAL FIBRILLATION: ICD-10-CM

## 2022-12-09 DIAGNOSIS — R94.4 ABNORMAL RESULTS OF KIDNEY FUNCTION STUDIES: ICD-10-CM

## 2022-12-09 DIAGNOSIS — Z88.1 ALLERGY STATUS TO OTHER ANTIBIOTIC AGENTS STATUS: ICD-10-CM

## 2022-12-09 DIAGNOSIS — Z98.890 OTHER SPECIFIED POSTPROCEDURAL STATES: Chronic | ICD-10-CM

## 2022-12-09 DIAGNOSIS — I10 ESSENTIAL (PRIMARY) HYPERTENSION: ICD-10-CM

## 2022-12-09 DIAGNOSIS — R11.2 NAUSEA WITH VOMITING, UNSPECIFIED: ICD-10-CM

## 2022-12-09 LAB
ALBUMIN SERPL ELPH-MCNC: 3.8 G/DL — SIGNIFICANT CHANGE UP (ref 3.3–5)
ALP SERPL-CCNC: 55 U/L — SIGNIFICANT CHANGE UP (ref 40–120)
ALT FLD-CCNC: 48 U/L — SIGNIFICANT CHANGE UP (ref 12–78)
ANION GAP SERPL CALC-SCNC: 5 MMOL/L — SIGNIFICANT CHANGE UP (ref 5–17)
APPEARANCE UR: CLEAR — SIGNIFICANT CHANGE UP
APTT BLD: 32.9 SEC — SIGNIFICANT CHANGE UP (ref 27.5–35.5)
AST SERPL-CCNC: 20 U/L — SIGNIFICANT CHANGE UP (ref 15–37)
BASOPHILS # BLD AUTO: 0.02 K/UL — SIGNIFICANT CHANGE UP (ref 0–0.2)
BASOPHILS NFR BLD AUTO: 0.2 % — SIGNIFICANT CHANGE UP (ref 0–2)
BILIRUB SERPL-MCNC: 1.1 MG/DL — SIGNIFICANT CHANGE UP (ref 0.2–1.2)
BILIRUB UR-MCNC: NEGATIVE — SIGNIFICANT CHANGE UP
BUN SERPL-MCNC: 20 MG/DL — SIGNIFICANT CHANGE UP (ref 7–23)
CALCIUM SERPL-MCNC: 8.4 MG/DL — LOW (ref 8.5–10.1)
CHLORIDE SERPL-SCNC: 105 MMOL/L — SIGNIFICANT CHANGE UP (ref 96–108)
CO2 SERPL-SCNC: 25 MMOL/L — SIGNIFICANT CHANGE UP (ref 22–31)
COLOR SPEC: YELLOW — SIGNIFICANT CHANGE UP
CREAT SERPL-MCNC: 1.37 MG/DL — HIGH (ref 0.5–1.3)
DIFF PNL FLD: ABNORMAL
EGFR: 60 ML/MIN/1.73M2 — SIGNIFICANT CHANGE UP
EOSINOPHIL # BLD AUTO: 0 K/UL — SIGNIFICANT CHANGE UP (ref 0–0.5)
EOSINOPHIL NFR BLD AUTO: 0 % — SIGNIFICANT CHANGE UP (ref 0–6)
FLUAV AG NPH QL: SIGNIFICANT CHANGE UP
FLUBV AG NPH QL: SIGNIFICANT CHANGE UP
GLUCOSE SERPL-MCNC: 148 MG/DL — HIGH (ref 70–99)
GLUCOSE UR QL: NEGATIVE — SIGNIFICANT CHANGE UP
HCT VFR BLD CALC: 49.7 % — SIGNIFICANT CHANGE UP (ref 39–50)
HGB BLD-MCNC: 17.2 G/DL — HIGH (ref 13–17)
IMM GRANULOCYTES NFR BLD AUTO: 0.8 % — SIGNIFICANT CHANGE UP (ref 0–0.9)
INR BLD: 1.18 RATIO — HIGH (ref 0.88–1.16)
KETONES UR-MCNC: ABNORMAL
LACTATE SERPL-SCNC: 1.5 MMOL/L — SIGNIFICANT CHANGE UP (ref 0.7–2)
LEUKOCYTE ESTERASE UR-ACNC: NEGATIVE — SIGNIFICANT CHANGE UP
LIDOCAIN IGE QN: 75 U/L — SIGNIFICANT CHANGE UP (ref 73–393)
LYMPHOCYTES # BLD AUTO: 0.55 K/UL — LOW (ref 1–3.3)
LYMPHOCYTES # BLD AUTO: 4.9 % — LOW (ref 13–44)
MCHC RBC-ENTMCNC: 33.7 PG — SIGNIFICANT CHANGE UP (ref 27–34)
MCHC RBC-ENTMCNC: 34.6 GM/DL — SIGNIFICANT CHANGE UP (ref 32–36)
MCV RBC AUTO: 97.3 FL — SIGNIFICANT CHANGE UP (ref 80–100)
MONOCYTES # BLD AUTO: 0.65 K/UL — SIGNIFICANT CHANGE UP (ref 0–0.9)
MONOCYTES NFR BLD AUTO: 5.8 % — SIGNIFICANT CHANGE UP (ref 2–14)
NEUTROPHILS # BLD AUTO: 9.88 K/UL — HIGH (ref 1.8–7.4)
NEUTROPHILS NFR BLD AUTO: 88.3 % — HIGH (ref 43–77)
NITRITE UR-MCNC: NEGATIVE — SIGNIFICANT CHANGE UP
PH UR: 5 — SIGNIFICANT CHANGE UP (ref 5–8)
PLATELET # BLD AUTO: 202 K/UL — SIGNIFICANT CHANGE UP (ref 150–400)
POTASSIUM SERPL-MCNC: 3.7 MMOL/L — SIGNIFICANT CHANGE UP (ref 3.5–5.3)
POTASSIUM SERPL-SCNC: 3.7 MMOL/L — SIGNIFICANT CHANGE UP (ref 3.5–5.3)
PROT SERPL-MCNC: 7.6 GM/DL — SIGNIFICANT CHANGE UP (ref 6–8.3)
PROT UR-MCNC: SIGNIFICANT CHANGE UP MG/DL
PROTHROM AB SERPL-ACNC: 13.7 SEC — HIGH (ref 10.5–13.4)
RBC # BLD: 5.11 M/UL — SIGNIFICANT CHANGE UP (ref 4.2–5.8)
RBC # FLD: 14.6 % — HIGH (ref 10.3–14.5)
RSV RNA NPH QL NAA+NON-PROBE: SIGNIFICANT CHANGE UP
SARS-COV-2 RNA SPEC QL NAA+PROBE: SIGNIFICANT CHANGE UP
SODIUM SERPL-SCNC: 135 MMOL/L — SIGNIFICANT CHANGE UP (ref 135–145)
SP GR SPEC: 1.02 — SIGNIFICANT CHANGE UP (ref 1.01–1.02)
TROPONIN I, HIGH SENSITIVITY RESULT: 6.01 NG/L — SIGNIFICANT CHANGE UP
UROBILINOGEN FLD QL: 4
WBC # BLD: 11.19 K/UL — HIGH (ref 3.8–10.5)
WBC # FLD AUTO: 11.19 K/UL — HIGH (ref 3.8–10.5)

## 2022-12-09 PROCEDURE — 0241U: CPT

## 2022-12-09 PROCEDURE — 74176 CT ABD & PELVIS W/O CONTRAST: CPT | Mod: 26,MA

## 2022-12-09 PROCEDURE — 80053 COMPREHEN METABOLIC PANEL: CPT

## 2022-12-09 PROCEDURE — 87086 URINE CULTURE/COLONY COUNT: CPT

## 2022-12-09 PROCEDURE — 81001 URINALYSIS AUTO W/SCOPE: CPT

## 2022-12-09 PROCEDURE — 85610 PROTHROMBIN TIME: CPT

## 2022-12-09 PROCEDURE — 96374 THER/PROPH/DIAG INJ IV PUSH: CPT

## 2022-12-09 PROCEDURE — 83605 ASSAY OF LACTIC ACID: CPT

## 2022-12-09 PROCEDURE — 74176 CT ABD & PELVIS W/O CONTRAST: CPT | Mod: MA

## 2022-12-09 PROCEDURE — 85730 THROMBOPLASTIN TIME PARTIAL: CPT

## 2022-12-09 PROCEDURE — 93010 ELECTROCARDIOGRAM REPORT: CPT

## 2022-12-09 PROCEDURE — 83690 ASSAY OF LIPASE: CPT

## 2022-12-09 PROCEDURE — 93005 ELECTROCARDIOGRAM TRACING: CPT

## 2022-12-09 PROCEDURE — 99285 EMERGENCY DEPT VISIT HI MDM: CPT

## 2022-12-09 PROCEDURE — 71045 X-RAY EXAM CHEST 1 VIEW: CPT

## 2022-12-09 PROCEDURE — 87040 BLOOD CULTURE FOR BACTERIA: CPT

## 2022-12-09 PROCEDURE — 36415 COLL VENOUS BLD VENIPUNCTURE: CPT

## 2022-12-09 PROCEDURE — 99285 EMERGENCY DEPT VISIT HI MDM: CPT | Mod: 25

## 2022-12-09 PROCEDURE — 71045 X-RAY EXAM CHEST 1 VIEW: CPT | Mod: 26

## 2022-12-09 PROCEDURE — 85025 COMPLETE CBC W/AUTO DIFF WBC: CPT

## 2022-12-09 PROCEDURE — 84484 ASSAY OF TROPONIN QUANT: CPT

## 2022-12-09 RX ORDER — ONDANSETRON 8 MG/1
1 TABLET, FILM COATED ORAL
Qty: 12 | Refills: 0
Start: 2022-12-09 | End: 2022-12-12

## 2022-12-09 RX ORDER — ONDANSETRON 8 MG/1
4 TABLET, FILM COATED ORAL ONCE
Refills: 0 | Status: COMPLETED | OUTPATIENT
Start: 2022-12-09 | End: 2022-12-09

## 2022-12-09 RX ORDER — SODIUM CHLORIDE 9 MG/ML
1000 INJECTION INTRAMUSCULAR; INTRAVENOUS; SUBCUTANEOUS ONCE
Refills: 0 | Status: COMPLETED | OUTPATIENT
Start: 2022-12-09 | End: 2022-12-09

## 2022-12-09 RX ORDER — ACETAMINOPHEN 500 MG
650 TABLET ORAL ONCE
Refills: 0 | Status: COMPLETED | OUTPATIENT
Start: 2022-12-09 | End: 2022-12-09

## 2022-12-09 RX ADMIN — SODIUM CHLORIDE 1000 MILLILITER(S): 9 INJECTION INTRAMUSCULAR; INTRAVENOUS; SUBCUTANEOUS at 14:54

## 2022-12-09 RX ADMIN — ONDANSETRON 4 MILLIGRAM(S): 8 TABLET, FILM COATED ORAL at 14:55

## 2022-12-09 RX ADMIN — SODIUM CHLORIDE 1000 MILLILITER(S): 9 INJECTION INTRAMUSCULAR; INTRAVENOUS; SUBCUTANEOUS at 14:55

## 2022-12-09 RX ADMIN — Medication 650 MILLIGRAM(S): at 14:55

## 2022-12-09 NOTE — ED STATDOCS - PROGRESS NOTE DETAILS
Patient seen and evaluated, ED attending note and orders reviewed, will continue with patient follow up and care -Kami Cool PA-C labs with mildly elevated creatinine 1.37, other labs WNL, UA with trace ketones, pt being hydrated with 2L IVF, feeling better, tolerating PO, will dc home with PMD and GI f/u, return precautions given  Kami Cool PA-C

## 2022-12-09 NOTE — ED STATDOCS - ATTENDING APP SHARED VISIT CONTRIBUTION OF CARE
I, Nicole Ervin DO,  performed the initial face to face bedside interview with this patient regarding history of present illness, review of symptoms and relevant past medical, social and family history.  I completed an independent physical examination.  I was the initial provider who evaluated this patient.   I personally saw the patient and performed a substantive portion of the visit including all aspects of the medical decision making.  I have signed out the follow up of any pending tests (i.e. labs, radiological studies) to the ACP.  I have communicated the patient’s plan of care and disposition with the ACP.  The history, relevant review of systems, past medical and surgical history, medical decision making, and physical examination was documented by the scribe in my presence and I attest to the accuracy of the documentation.

## 2022-12-09 NOTE — ED STATDOCS - NSICDXFAMILYHX_GEN_ALL_CORE_FT
FAMILY HISTORY:  Father  Still living? Unknown  Family history of diabetes mellitus in father, Age at diagnosis: Age Unknown

## 2022-12-09 NOTE — ED ADULT NURSE NOTE - NSICDXPASTMEDICALHX_GEN_ALL_CORE_FT
PAST MEDICAL HISTORY:  Atrial fibrillation, unspecified type     Chiari I malformation     HTN (hypertension)

## 2022-12-09 NOTE — ED STATDOCS - PATIENT PORTAL LINK FT
You can access the FollowMyHealth Patient Portal offered by [facility] by registering at the following website: [email or web address]. By joining [facility]’s FollowMyHealth portal, you will also be able to view your health information using other applications (apps) compatible with our system.

## 2022-12-09 NOTE — ED STATDOCS - NSFOLLOWUPINSTRUCTIONS_ED_ALL_ED_FT
Dehydration    WHAT YOU NEED TO KNOW:    Dehydration is a condition that develops when your body does not have enough fluid. You may become dehydrated if you do not drink enough water or lose too much fluid. Fluid loss may also cause loss of electrolytes (minerals), such as sodium.    DISCHARGE INSTRUCTIONS:    Seek care immediately if:   •You have a seizure.      •You are confused or cannot think clearly.      •You are extremely sleepy, or another person cannot wake you.       •You become dizzy or faint when you stand.      •You are not able to urinate.      •You have trouble breathing.      •You have a fast or irregular heartbeat.      •Your hands or feet are cold, or your face is pale.       Contact your healthcare provider if:   •You have trouble drinking liquids because you are vomiting.      •Your symptoms get worse.      •You have a fever.       •You feel very weak or tired.      •You have questions or concerns about your condition or care.      Follow up with your healthcare provider as directed: Write down your questions so you remember to ask them during your visits.     Prevent or manage dehydration:   •Drink liquids as directed. Liquids that contain water, sugar, and minerals can help your body hold in fluid and help prevent dehydration. Drink liquids throughout the day, not just when you feel thirsty. Men should drink about 3 liters (13 eight-ounce cups) of liquid each day. Women should drink about 2 liters (9 eight-ounce cups) of liquid each day. Drink even more liquid if you will be outdoors, in the sun for a long time, or exercising.       •Stay cool. Limit the time you spend outdoors during the hottest part of the day. Dress in lightweight clothes.       •Keep track of how often you urinate. If you urinate less than usual or your urine is darker, drink more liquids.      Acute Nausea and Vomiting    WHAT YOU NEED TO KNOW:    Acute nausea and vomiting start suddenly, worsen quickly, and last a short time.    DISCHARGE INSTRUCTIONS:    Return to the emergency department if:     You see blood in your vomit or your bowel movements.      You have sudden, severe pain in your chest and upper abdomen after hard vomiting or retching.      You have swelling in your neck and chest.       You are dizzy, cold, and thirsty and your eyes and mouth are dry.      You are urinating very little or not at all.      You have muscle weakness, leg cramps, and trouble breathing.       Your heart is beating much faster than normal.       You continue to vomit for more than 48 hours.     Contact your healthcare provider if:     You have frequent dry heaves (vomiting but nothing comes out).      Your nausea and vomiting does not get better or go away after you use medicine.      You have questions or concerns about your condition or treatment.    Medicines: You may need any of the following:     Medicines may be given to calm your stomach and stop your vomiting. You may also need medicines to help you feel more relaxed or to stop nausea and vomiting caused by motion sickness.      Gastrointestinal stimulants are used to help empty your stomach and bowels. This may help decrease nausea and vomiting.      Take your medicine as directed. Contact your healthcare provider if you think your medicine is not helping or if you have side effects. Tell him or her if you are allergic to any medicine. Keep a list of the medicines, vitamins, and herbs you take. Include the amounts, and when and why you take them. Bring the list or the pill bottles to follow-up visits. Carry your medicine list with you in case of an emergency.    Prevent or manage acute nausea and vomiting:     Do not drink alcohol. Alcohol may upset or irritate your stomach. Too much alcohol can also cause acute nausea and vomiting.      Control stress. Headaches due to stress may cause nausea and vomiting. Find ways to relax and manage your stress. Get more rest and sleep.      Drink more liquids as directed. Vomiting can lead to dehydration. It is important to drink more liquids to help replace lost body fluids. Ask your healthcare provider how much liquid to drink each day and which liquids are best for you. Your provider may recommend that you drink an oral rehydration solution (ORS). ORS contains water, salts, and sugar that are needed to replace the lost body fluids. Ask what kind of ORS to use, how much to drink, and where to get it.      Eat smaller meals, more often. Eat small amounts of food every 2 to 3 hours, even if you are not hungry. Food in your stomach may decrease your nausea.      Talk to your healthcare provider before you take over-the-counter (OTC) medicines. These medicines can cause serious problems if you use certain other medicines, or you have a medical condition. You may have problems if you use too much or use them for longer than the label says. Follow directions on the label carefully.     Follow up with your healthcare provider as directed: Write down your questions so you remember to ask them during your follow-up visits.

## 2022-12-09 NOTE — ED ADULT TRIAGE NOTE - CHIEF COMPLAINT QUOTE
patient presenting ambulatory to ED, sent in from urgent care c/o nausea, fever (tmax 101), diarrhea. patient sent from urgent care for IV hydration. last tylenol taken at 0845. oxygen saturation 94% and  in triage.

## 2022-12-09 NOTE — ED STATDOCS - OBJECTIVE STATEMENT
56 yo male w/PMHx of Afib on Asprin, HTN presents for vomiting [id number] in the last 24 hours. Pt has not vomited since this morning. Pt c/o nausea, vomiting, and diarrhea. Pt did not take anything for the nausea or fever PTA. Pt is c/o SOB and feeling dehydrated. 56 yo male w/PMHx of Afib on Asprin, HTN presents for vomiting [id number] in the last 24 hours. Pt has not vomited since this morning. Pt c/o nausea, vomiting, abd pain and diarrhea. Pt did not take anything for the nausea or fever PTA. Pt is c/o SOB and feeling dehydrated. Pt went to  this morning and was told that he should come to the ED for further evaluation. Denies CP. Pt denies any abd surgeries. Pt last took Tylenol this morning. No other complaints at this time. 56 yo male w/PMHx of Afib on Asprin, HTN presents for vomiting [id number] in the last 24 hours. Pt has not vomited since this morning. Pt c/o nausea, vomiting, abd pain and diarrhea. Pt did not take anything for the nausea or fever PTA; feeling dehydrated. Pt went to  this morning and was told that he should come to the ED for further evaluation. Denies CP. Pt denies any abd surgeries. Pt last took Tylenol this morning. No other complaints at this time.

## 2022-12-09 NOTE — ED STATDOCS - NS ED ATTENDING STATEMENT MOD
This was a shared visit with the REZA. I reviewed and verified the documentation and independently performed the documented:

## 2022-12-09 NOTE — ED ADULT NURSE NOTE - OBJECTIVE STATEMENT
Pt presents from UC with c/o of N/V/abdominal pain.  Pt states he think s he constipated, had a small bowel movement this am.  Sent from  for hydration.

## 2022-12-09 NOTE — ED STATDOCS - GASTROINTESTINAL, MLM
abdomen soft, non-tender, and non-distended. Bowel sounds present. abdomen soft, epigastric ttp, and non-distended. Bowel sounds present.

## 2022-12-10 LAB
CULTURE RESULTS: SIGNIFICANT CHANGE UP
SPECIMEN SOURCE: SIGNIFICANT CHANGE UP

## 2022-12-14 LAB
CULTURE RESULTS: SIGNIFICANT CHANGE UP
CULTURE RESULTS: SIGNIFICANT CHANGE UP
SPECIMEN SOURCE: SIGNIFICANT CHANGE UP
SPECIMEN SOURCE: SIGNIFICANT CHANGE UP

## 2023-05-23 ENCOUNTER — NON-APPOINTMENT (OUTPATIENT)
Age: 58
End: 2023-05-23

## 2023-06-19 NOTE — ED STATDOCS - CARDIAC, MLM
Pt recd supine NAD. A x o x4, able to f/u commands. C/o L sided weakness. Noted dressing of B lower legs. Pt has h/o b/l foot drop. No c/o pain. Pt seen with wound care specialist  Ryland Llanos. Pt recd supine NAD c BLE wrapped in Amber boots .Daughter Shavonne at bedside ( present throughout session )  who along with her sister over the phone provided history  on patient's BLE wounds.  Pt has H/o CVA c L polo sicne Jan 2023. Post Rehab @ [facility], pt has been home c Home care services . He spends 2-3 hours  in wheelchair twice a day , other times he is in bed . normal rate, regular rhythm, and no murmur.

## 2023-09-28 ENCOUNTER — NON-APPOINTMENT (OUTPATIENT)
Age: 58
End: 2023-09-28

## 2023-10-09 ENCOUNTER — NON-APPOINTMENT (OUTPATIENT)
Age: 58
End: 2023-10-09

## 2024-05-09 NOTE — ED PROVIDER NOTE - MUSCULOSKELETAL, MLM
Assessment/Plan:   1. Pain and swelling of right lower extremity  Reviewed patient symptoms today.  Some, symptoms appear concerning for possible thrombus formation.  He has been taking his Eliquis at a lower dose.  Will send patient for stat venous duplex.  Follow-up if any symptoms worsen.  - VAS VENOUS DUPLEX -LOWER LIMB UNILATERAL; Future               Diagnoses and all orders for this visit:    Pain and swelling of right lower extremity  -     VAS VENOUS DUPLEX -LOWER LIMB UNILATERAL; Future  -     VAS VENOUS DUPLEX -LOWER LIMB UNILATERAL; Future    Depression, recurrent (HCC)    Right pulmonary embolus (HCC)    Cerebellar ataxia (HCC)    SVT (supraventricular tachycardia)    Seizure (HCC)    Thrombocytopenia, unspecified (HCC)    Stage 3a chronic kidney disease (HCC)          Subjective:       Chief Complaint   Patient presents with    Leg Pain     Right calf pain       Patient ID: Bradley Evans is a 67 y.o. male.    Leg Pain   The incident occurred 5 to 7 days ago. The incident occurred at home. There was no injury mechanism. Pain location: right calf. The quality of the pain is described as stabbing. The pain has been Intermittent since onset. Pertinent negatives include no inability to bear weight, numbness or tingling. The symptoms are aggravated by movement. He has tried nothing for the symptoms.       Review of Systems   Constitutional:  Negative for activity change, chills, fatigue and fever.   HENT:  Negative for congestion, ear pain, sinus pressure and sore throat.    Eyes:  Negative for redness, itching and visual disturbance.   Respiratory:  Negative for cough and shortness of breath.    Cardiovascular:  Negative for chest pain and palpitations.   Gastrointestinal:  Negative for abdominal pain, diarrhea and nausea.   Endocrine: Negative for cold intolerance and heat intolerance.   Genitourinary:  Negative for dysuria, flank pain and frequency.   Musculoskeletal:  Negative for arthralgias, back  pain, gait problem and myalgias.   Skin:  Negative for color change.   Allergic/Immunologic: Negative for environmental allergies.   Neurological:  Negative for dizziness, tingling, numbness and headaches.   Psychiatric/Behavioral:  Negative for behavioral problems and sleep disturbance.        The following portions of the patient's history were reviewed and updated as appropriate : past family history, past medical history, past social history and past surgical history.    Current Outpatient Medications:     ALPRAZolam (XANAX) 0.5 mg tablet, Take 1 tablet (0.5 mg total) by mouth daily as needed for anxiety, Disp: 30 tablet, Rfl: 0    apixaban (ELIQUIS) 5 mg, Take 2.5 mg by mouth 2 (two) times a day, Disp: , Rfl:     aspirin (ECOTRIN LOW STRENGTH) 81 mg EC tablet, Take 81 mg by mouth daily, Disp: , Rfl:     atorvastatin (LIPITOR) 40 mg tablet, Take 1 tablet (40 mg total) by mouth daily, Disp: 90 tablet, Rfl: 1    Coenzyme Q10 300 MG CAPS, Take by mouth daily , Disp: , Rfl:     DULoxetine (CYMBALTA) 30 mg delayed release capsule, Take 1 capsule (30 mg total) by mouth daily Can take at night if it makes you sleepy, Disp: 90 capsule, Rfl: 1    hydroCHLOROthiazide 25 mg tablet, Take 1 tablet (25 mg total) by mouth daily, Disp: 90 tablet, Rfl: 1    hydrocortisone 2.5 % cream, , Disp: , Rfl:     ketoconazole (NIZORAL) 2 % shampoo, Apply 1 application topically 2 (two) times a week, Disp: 120 mL, Rfl: 0    levETIRAcetam (KEPPRA) 500 mg tablet, Take 500 mg by mouth 2 (two) times a day, Disp: , Rfl:     multivitamin (THERAGRAN) TABS, Take 1 tablet by mouth daily, Disp: , Rfl:     nebivolol (BYSTOLIC) 10 mg tablet, Take 1 tablet (10 mg total) by mouth daily, Disp: 90 tablet, Rfl: 1    omeprazole (PriLOSEC) 40 MG capsule, as needed, Disp: , Rfl:     tamsulosin (FLOMAX) 0.4 mg, Take 1 capsule (0.4 mg total) by mouth daily with dinner, Disp: 90 capsule, Rfl: 1    vitamin B-12 (VITAMIN B-12) 1,000 mcg tablet, Take 1 tablet by  "mouth daily, Disp: , Rfl:     amitriptyline (ELAVIL) 10 mg tablet, , Disp: , Rfl:     azelastine (ASTELIN) 0.1 % nasal spray, 1 spray into each nostril 2 (two) times a day Use in each nostril as directed (Patient not taking: Reported on 7/6/2023), Disp: 1 mL, Rfl: 2    cholecalciferol (VITAMIN D3) 1,000 units tablet, Take 1,000 Units by mouth daily (Patient not taking: Reported on 3/2/2023), Disp: , Rfl:     fluticasone (FLONASE) 50 mcg/act nasal spray, USE 2 SPRAYS IN EACH       NOSTRIL DAILY (Patient not taking: Reported on 10/16/2023), Disp: 48 g, Rfl: 1    predniSONE 10 mg tablet, Take 6 tablets By mouth  In the morning Qd.   Decrease by  By 1 tablet daily until completed. (Patient not taking: Reported on 10/16/2023), Disp: 21 tablet, Rfl: 0    triamcinolone (KENALOG) 0.1 % oral topical paste, Apply 1 Application topically 2 (two) times a day (Patient not taking: Reported on 11/15/2023), Disp: 5 g, Rfl: 0         Objective:         Vitals:    05/09/24 1126   BP: 140/76   BP Location: Left arm   Patient Position: Sitting   Cuff Size: Large   Pulse: 71   Temp: 98 °F (36.7 °C)   TempSrc: Temporal   SpO2: 95%   Weight: 96 kg (211 lb 9.6 oz)   Height: 5' 7.5\" (1.715 m)     Physical Exam  Vitals reviewed.   Constitutional:       Appearance: Normal appearance. He is well-developed.   HENT:      Head: Normocephalic and atraumatic.      Right Ear: Hearing normal.      Left Ear: Hearing normal.      Nose: Nose normal. No septal deviation.   Eyes:      General: Lids are normal.      Pupils: Pupils are equal, round, and reactive to light.   Neck:      Thyroid: No thyroid mass or thyromegaly.      Trachea: Trachea normal.   Cardiovascular:      Rate and Rhythm: Normal rate.   Pulmonary:      Effort: Pulmonary effort is normal. No respiratory distress.      Breath sounds: No decreased breath sounds.   Abdominal:      Tenderness: There is no guarding.   Musculoskeletal:         General: Normal range of motion.      Cervical " back: Normal range of motion.      Right lower leg: Tenderness present. No bony tenderness.        Legs:    Skin:     General: Skin is warm and dry.   Neurological:      Mental Status: He is alert and oriented to person, place, and time.      Cranial Nerves: No cranial nerve deficit.      Sensory: No sensory deficit.   Psychiatric:         Speech: Speech normal.         Behavior: Behavior normal.         Thought Content: Thought content normal.         Judgment: Judgment normal.          Spine appears normal, range of motion is not limited, no muscle or joint tenderness

## 2025-03-11 ENCOUNTER — NON-APPOINTMENT (OUTPATIENT)
Age: 60
End: 2025-03-11

## 2025-05-15 ENCOUNTER — NON-APPOINTMENT (OUTPATIENT)
Age: 60
End: 2025-05-15

## 2025-06-16 NOTE — ED ADULT NURSE NOTE - SEPSIS REFERENCE DATA CRITERIA 1
Orders for admission, patient is aware of plan and ready to go upstairs. Any questions, please call ED LILLY Diaz at extension [phone].     Type of COVID test sent:N/A  COVID Suspicion level: Low      Titratable drug(s) infusing:N/A    LOC at time of transport:A&Ox4    Other pertinent information:Pt speaks Mexican. Does understand and speak English as well. Hard of hearing.     CIWA score=N/A  NIH score= N/A   Abormal VS: Temp > 100F or < 96.8F; SBP < 90 mmHG; HR > 120bpm; Resp > 24/min

## 2025-08-22 ENCOUNTER — OUTPATIENT (OUTPATIENT)
Dept: OUTPATIENT SERVICES | Facility: HOSPITAL | Age: 60
LOS: 1 days | End: 2025-08-22
Payer: COMMERCIAL

## 2025-08-22 ENCOUNTER — APPOINTMENT (OUTPATIENT)
Dept: ULTRASOUND IMAGING | Facility: CLINIC | Age: 60
End: 2025-08-22
Payer: COMMERCIAL

## 2025-08-22 DIAGNOSIS — Z98.890 OTHER SPECIFIED POSTPROCEDURAL STATES: Chronic | ICD-10-CM

## 2025-08-22 DIAGNOSIS — Z00.8 ENCOUNTER FOR OTHER GENERAL EXAMINATION: ICD-10-CM

## 2025-08-22 PROCEDURE — 76700 US EXAM ABDOM COMPLETE: CPT | Mod: 26

## 2025-08-22 PROCEDURE — 76700 US EXAM ABDOM COMPLETE: CPT
